# Patient Record
Sex: FEMALE | Race: WHITE | NOT HISPANIC OR LATINO
[De-identification: names, ages, dates, MRNs, and addresses within clinical notes are randomized per-mention and may not be internally consistent; named-entity substitution may affect disease eponyms.]

---

## 2017-03-22 ENCOUNTER — FORM ENCOUNTER (OUTPATIENT)
Age: 57
End: 2017-03-22

## 2017-03-23 ENCOUNTER — OUTPATIENT (OUTPATIENT)
Dept: OUTPATIENT SERVICES | Facility: HOSPITAL | Age: 57
LOS: 1 days | End: 2017-03-23
Payer: MEDICAID

## 2017-03-23 ENCOUNTER — APPOINTMENT (OUTPATIENT)
Dept: CT IMAGING | Facility: CLINIC | Age: 57
End: 2017-03-23

## 2017-03-23 DIAGNOSIS — G95.20 UNSPECIFIED CORD COMPRESSION: ICD-10-CM

## 2017-03-23 DIAGNOSIS — D43.2 NEOPLASM OF UNCERTAIN BEHAVIOR OF BRAIN, UNSPECIFIED: ICD-10-CM

## 2017-03-23 DIAGNOSIS — Q76.1 KLIPPEL-FEIL SYNDROME: Chronic | ICD-10-CM

## 2017-03-23 DIAGNOSIS — Z98.89 OTHER SPECIFIED POSTPROCEDURAL STATES: Chronic | ICD-10-CM

## 2017-03-23 DIAGNOSIS — Z90.89 ACQUIRED ABSENCE OF OTHER ORGANS: Chronic | ICD-10-CM

## 2017-03-23 PROCEDURE — 76377 3D RENDER W/INTRP POSTPROCES: CPT

## 2017-03-23 PROCEDURE — 72125 CT NECK SPINE W/O DYE: CPT

## 2017-04-17 ENCOUNTER — APPOINTMENT (OUTPATIENT)
Dept: NEUROSURGERY | Facility: CLINIC | Age: 57
End: 2017-04-17

## 2017-04-17 VITALS
DIASTOLIC BLOOD PRESSURE: 60 MMHG | WEIGHT: 120 LBS | SYSTOLIC BLOOD PRESSURE: 110 MMHG | OXYGEN SATURATION: 98 % | HEART RATE: 74 BPM | BODY MASS INDEX: 19.29 KG/M2 | TEMPERATURE: 98 F | HEIGHT: 66 IN

## 2017-04-17 DIAGNOSIS — G93.0 CEREBRAL CYSTS: ICD-10-CM

## 2017-10-18 ENCOUNTER — OUTPATIENT (OUTPATIENT)
Dept: OUTPATIENT SERVICES | Facility: HOSPITAL | Age: 57
LOS: 1 days | End: 2017-10-18
Payer: MEDICARE

## 2017-10-18 ENCOUNTER — APPOINTMENT (OUTPATIENT)
Dept: ULTRASOUND IMAGING | Facility: CLINIC | Age: 57
End: 2017-10-18
Payer: MEDICARE

## 2017-10-18 DIAGNOSIS — Z00.8 ENCOUNTER FOR OTHER GENERAL EXAMINATION: ICD-10-CM

## 2017-10-18 DIAGNOSIS — Z90.89 ACQUIRED ABSENCE OF OTHER ORGANS: Chronic | ICD-10-CM

## 2017-10-18 DIAGNOSIS — Z98.89 OTHER SPECIFIED POSTPROCEDURAL STATES: Chronic | ICD-10-CM

## 2017-10-18 DIAGNOSIS — Q76.1 KLIPPEL-FEIL SYNDROME: Chronic | ICD-10-CM

## 2017-10-18 PROCEDURE — 93970 EXTREMITY STUDY: CPT | Mod: 26

## 2017-10-18 PROCEDURE — 93970 EXTREMITY STUDY: CPT

## 2017-10-24 ENCOUNTER — FORM ENCOUNTER (OUTPATIENT)
Age: 57
End: 2017-10-24

## 2017-10-25 ENCOUNTER — APPOINTMENT (OUTPATIENT)
Dept: MRI IMAGING | Facility: CLINIC | Age: 57
End: 2017-10-25
Payer: MEDICARE

## 2017-10-25 ENCOUNTER — OUTPATIENT (OUTPATIENT)
Dept: OUTPATIENT SERVICES | Facility: HOSPITAL | Age: 57
LOS: 1 days | End: 2017-10-25
Payer: MEDICARE

## 2017-10-25 ENCOUNTER — APPOINTMENT (OUTPATIENT)
Dept: CT IMAGING | Facility: CLINIC | Age: 57
End: 2017-10-25
Payer: MEDICARE

## 2017-10-25 DIAGNOSIS — Z98.89 OTHER SPECIFIED POSTPROCEDURAL STATES: Chronic | ICD-10-CM

## 2017-10-25 DIAGNOSIS — R06.02 SHORTNESS OF BREATH: ICD-10-CM

## 2017-10-25 DIAGNOSIS — G93.0 CEREBRAL CYSTS: ICD-10-CM

## 2017-10-25 DIAGNOSIS — Q76.1 KLIPPEL-FEIL SYNDROME: Chronic | ICD-10-CM

## 2017-10-25 DIAGNOSIS — Z90.89 ACQUIRED ABSENCE OF OTHER ORGANS: Chronic | ICD-10-CM

## 2017-10-25 PROCEDURE — 70553 MRI BRAIN STEM W/O & W/DYE: CPT | Mod: 26

## 2017-10-25 PROCEDURE — 71250 CT THORAX DX C-: CPT

## 2017-10-25 PROCEDURE — 72156 MRI NECK SPINE W/O & W/DYE: CPT | Mod: 26

## 2017-10-25 PROCEDURE — 70553 MRI BRAIN STEM W/O & W/DYE: CPT

## 2017-10-25 PROCEDURE — 71250 CT THORAX DX C-: CPT | Mod: 26

## 2017-10-25 PROCEDURE — 72156 MRI NECK SPINE W/O & W/DYE: CPT

## 2017-10-25 PROCEDURE — A9585: CPT

## 2017-10-27 ENCOUNTER — APPOINTMENT (OUTPATIENT)
Dept: MRI IMAGING | Facility: CLINIC | Age: 57
End: 2017-10-27

## 2017-10-30 DIAGNOSIS — Z98.1 ARTHRODESIS STATUS: ICD-10-CM

## 2017-10-30 DIAGNOSIS — G93.89 OTHER SPECIFIED DISORDERS OF BRAIN: ICD-10-CM

## 2017-10-30 DIAGNOSIS — R91.8 OTHER NONSPECIFIC ABNORMAL FINDING OF LUNG FIELD: ICD-10-CM

## 2017-10-30 DIAGNOSIS — J43.9 EMPHYSEMA, UNSPECIFIED: ICD-10-CM

## 2018-01-09 ENCOUNTER — OUTPATIENT (OUTPATIENT)
Dept: OUTPATIENT SERVICES | Facility: HOSPITAL | Age: 58
LOS: 1 days | End: 2018-01-09
Payer: MEDICARE

## 2018-01-09 ENCOUNTER — APPOINTMENT (OUTPATIENT)
Dept: RADIOLOGY | Facility: CLINIC | Age: 58
End: 2018-01-09

## 2018-01-09 DIAGNOSIS — Q76.1 KLIPPEL-FEIL SYNDROME: Chronic | ICD-10-CM

## 2018-01-09 DIAGNOSIS — Z90.89 ACQUIRED ABSENCE OF OTHER ORGANS: Chronic | ICD-10-CM

## 2018-01-09 DIAGNOSIS — M25.50 PAIN IN UNSPECIFIED JOINT: ICD-10-CM

## 2018-01-09 DIAGNOSIS — R76.9 ABNORMAL IMMUNOLOGICAL FINDING IN SERUM, UNSPECIFIED: ICD-10-CM

## 2018-01-09 DIAGNOSIS — Z98.89 OTHER SPECIFIED POSTPROCEDURAL STATES: Chronic | ICD-10-CM

## 2018-01-09 DIAGNOSIS — R70.0 ELEVATED ERYTHROCYTE SEDIMENTATION RATE: ICD-10-CM

## 2018-01-09 PROCEDURE — 72170 X-RAY EXAM OF PELVIS: CPT | Mod: 26

## 2018-01-09 PROCEDURE — 73630 X-RAY EXAM OF FOOT: CPT

## 2018-01-09 PROCEDURE — 73080 X-RAY EXAM OF ELBOW: CPT

## 2018-01-09 PROCEDURE — 73562 X-RAY EXAM OF KNEE 3: CPT

## 2018-01-09 PROCEDURE — 73630 X-RAY EXAM OF FOOT: CPT | Mod: 26,50

## 2018-01-09 PROCEDURE — 73130 X-RAY EXAM OF HAND: CPT | Mod: 26,50

## 2018-01-09 PROCEDURE — 72170 X-RAY EXAM OF PELVIS: CPT

## 2018-01-09 PROCEDURE — 73080 X-RAY EXAM OF ELBOW: CPT | Mod: 26,50

## 2018-01-09 PROCEDURE — 73030 X-RAY EXAM OF SHOULDER: CPT | Mod: 26,50

## 2018-01-09 PROCEDURE — 73030 X-RAY EXAM OF SHOULDER: CPT

## 2018-01-09 PROCEDURE — 73130 X-RAY EXAM OF HAND: CPT

## 2018-01-09 PROCEDURE — 73562 X-RAY EXAM OF KNEE 3: CPT | Mod: 26,50

## 2021-05-11 ENCOUNTER — INPATIENT (INPATIENT)
Facility: HOSPITAL | Age: 61
LOS: 3 days | Discharge: HOME | DRG: 189 | End: 2021-05-15
Attending: STUDENT IN AN ORGANIZED HEALTH CARE EDUCATION/TRAINING PROGRAM | Admitting: STUDENT IN AN ORGANIZED HEALTH CARE EDUCATION/TRAINING PROGRAM
Payer: MEDICARE

## 2021-05-11 VITALS — WEIGHT: 134.92 LBS | HEIGHT: 65 IN | HEART RATE: 122 BPM

## 2021-05-11 DIAGNOSIS — Z98.89 OTHER SPECIFIED POSTPROCEDURAL STATES: Chronic | ICD-10-CM

## 2021-05-11 DIAGNOSIS — Q76.1 KLIPPEL-FEIL SYNDROME: Chronic | ICD-10-CM

## 2021-05-11 DIAGNOSIS — J44.9 CHRONIC OBSTRUCTIVE PULMONARY DISEASE, UNSPECIFIED: ICD-10-CM

## 2021-05-11 DIAGNOSIS — Z90.89 ACQUIRED ABSENCE OF OTHER ORGANS: Chronic | ICD-10-CM

## 2021-05-11 LAB
ALBUMIN SERPL ELPH-MCNC: 4.4 G/DL — SIGNIFICANT CHANGE UP (ref 3.3–5.2)
ALP SERPL-CCNC: 86 U/L — SIGNIFICANT CHANGE UP (ref 40–120)
ALT FLD-CCNC: 10 U/L — SIGNIFICANT CHANGE UP
ANION GAP SERPL CALC-SCNC: 10 MMOL/L — SIGNIFICANT CHANGE UP (ref 5–17)
AST SERPL-CCNC: 29 U/L — SIGNIFICANT CHANGE UP
BASOPHILS # BLD AUTO: 0.12 K/UL — SIGNIFICANT CHANGE UP (ref 0–0.2)
BASOPHILS NFR BLD AUTO: 1 % — SIGNIFICANT CHANGE UP (ref 0–2)
BILIRUB SERPL-MCNC: 0.2 MG/DL — LOW (ref 0.4–2)
BUN SERPL-MCNC: 9 MG/DL — SIGNIFICANT CHANGE UP (ref 8–20)
CALCIUM SERPL-MCNC: 9 MG/DL — SIGNIFICANT CHANGE UP (ref 8.6–10.2)
CHLORIDE SERPL-SCNC: 100 MMOL/L — SIGNIFICANT CHANGE UP (ref 98–107)
CO2 SERPL-SCNC: 25 MMOL/L — SIGNIFICANT CHANGE UP (ref 22–29)
CREAT SERPL-MCNC: 0.51 MG/DL — SIGNIFICANT CHANGE UP (ref 0.5–1.3)
EOSINOPHIL # BLD AUTO: 0.63 K/UL — HIGH (ref 0–0.5)
EOSINOPHIL NFR BLD AUTO: 5.3 % — SIGNIFICANT CHANGE UP (ref 0–6)
GLUCOSE SERPL-MCNC: 127 MG/DL — HIGH (ref 70–99)
HCT VFR BLD CALC: 45 % — SIGNIFICANT CHANGE UP (ref 34.5–45)
HGB BLD-MCNC: 14.1 G/DL — SIGNIFICANT CHANGE UP (ref 11.5–15.5)
IMM GRANULOCYTES NFR BLD AUTO: 0.4 % — SIGNIFICANT CHANGE UP (ref 0–1.5)
LYMPHOCYTES # BLD AUTO: 17.3 % — SIGNIFICANT CHANGE UP (ref 13–44)
LYMPHOCYTES # BLD AUTO: 2.07 K/UL — SIGNIFICANT CHANGE UP (ref 1–3.3)
MCHC RBC-ENTMCNC: 31.3 GM/DL — LOW (ref 32–36)
MCHC RBC-ENTMCNC: 31.3 PG — SIGNIFICANT CHANGE UP (ref 27–34)
MCV RBC AUTO: 99.8 FL — SIGNIFICANT CHANGE UP (ref 80–100)
MONOCYTES # BLD AUTO: 0.79 K/UL — SIGNIFICANT CHANGE UP (ref 0–0.9)
MONOCYTES NFR BLD AUTO: 6.6 % — SIGNIFICANT CHANGE UP (ref 2–14)
NEUTROPHILS # BLD AUTO: 8.31 K/UL — HIGH (ref 1.8–7.4)
NEUTROPHILS NFR BLD AUTO: 69.4 % — SIGNIFICANT CHANGE UP (ref 43–77)
NT-PROBNP SERPL-SCNC: 206 PG/ML — SIGNIFICANT CHANGE UP (ref 0–300)
PLATELET # BLD AUTO: 231 K/UL — SIGNIFICANT CHANGE UP (ref 150–400)
POTASSIUM SERPL-MCNC: 5.4 MMOL/L — HIGH (ref 3.5–5.3)
POTASSIUM SERPL-SCNC: 5.4 MMOL/L — HIGH (ref 3.5–5.3)
PROT SERPL-MCNC: 7.5 G/DL — SIGNIFICANT CHANGE UP (ref 6.6–8.7)
RBC # BLD: 4.51 M/UL — SIGNIFICANT CHANGE UP (ref 3.8–5.2)
RBC # FLD: 13.4 % — SIGNIFICANT CHANGE UP (ref 10.3–14.5)
SARS-COV-2 RNA SPEC QL NAA+PROBE: SIGNIFICANT CHANGE UP
SODIUM SERPL-SCNC: 135 MMOL/L — SIGNIFICANT CHANGE UP (ref 135–145)
TROPONIN T SERPL-MCNC: <0.01 NG/ML — SIGNIFICANT CHANGE UP (ref 0–0.06)
WBC # BLD: 11.97 K/UL — HIGH (ref 3.8–10.5)
WBC # FLD AUTO: 11.97 K/UL — HIGH (ref 3.8–10.5)

## 2021-05-11 PROCEDURE — 93010 ELECTROCARDIOGRAM REPORT: CPT

## 2021-05-11 PROCEDURE — 99291 CRITICAL CARE FIRST HOUR: CPT | Mod: CS

## 2021-05-11 PROCEDURE — 71045 X-RAY EXAM CHEST 1 VIEW: CPT | Mod: 26

## 2021-05-11 PROCEDURE — 99223 1ST HOSP IP/OBS HIGH 75: CPT

## 2021-05-11 RX ORDER — METHOTREXATE 2.5 MG/1
6 TABLET ORAL
Qty: 0 | Refills: 0 | DISCHARGE

## 2021-05-11 RX ORDER — ENOXAPARIN SODIUM 100 MG/ML
40 INJECTION SUBCUTANEOUS DAILY
Refills: 0 | Status: DISCONTINUED | OUTPATIENT
Start: 2021-05-11 | End: 2021-05-15

## 2021-05-11 RX ORDER — GABAPENTIN 400 MG/1
900 CAPSULE ORAL THREE TIMES A DAY
Refills: 0 | Status: DISCONTINUED | OUTPATIENT
Start: 2021-05-11 | End: 2021-05-15

## 2021-05-11 RX ORDER — FOLIC ACID 0.8 MG
1 TABLET ORAL
Qty: 0 | Refills: 0 | DISCHARGE

## 2021-05-11 RX ORDER — ALBUTEROL 90 UG/1
2 AEROSOL, METERED ORAL EVERY 4 HOURS
Refills: 0 | Status: DISCONTINUED | OUTPATIENT
Start: 2021-05-11 | End: 2021-05-15

## 2021-05-11 RX ORDER — FOLIC ACID 0.8 MG
1 TABLET ORAL DAILY
Refills: 0 | Status: DISCONTINUED | OUTPATIENT
Start: 2021-05-11 | End: 2021-05-15

## 2021-05-11 RX ORDER — IPRATROPIUM/ALBUTEROL SULFATE 18-103MCG
3 AEROSOL WITH ADAPTER (GRAM) INHALATION EVERY 6 HOURS
Refills: 0 | Status: DISCONTINUED | OUTPATIENT
Start: 2021-05-11 | End: 2021-05-13

## 2021-05-11 RX ORDER — OXYCODONE AND ACETAMINOPHEN 5; 325 MG/1; MG/1
1 TABLET ORAL EVERY 6 HOURS
Refills: 0 | Status: DISCONTINUED | OUTPATIENT
Start: 2021-05-11 | End: 2021-05-15

## 2021-05-11 RX ORDER — AZITHROMYCIN 500 MG/1
500 TABLET, FILM COATED ORAL ONCE
Refills: 0 | Status: COMPLETED | OUTPATIENT
Start: 2021-05-11 | End: 2021-05-11

## 2021-05-11 RX ORDER — IPRATROPIUM/ALBUTEROL SULFATE 18-103MCG
3 AEROSOL WITH ADAPTER (GRAM) INHALATION
Refills: 0 | Status: DISCONTINUED | OUTPATIENT
Start: 2021-05-11 | End: 2021-05-13

## 2021-05-11 RX ORDER — MAGNESIUM SULFATE 500 MG/ML
2 VIAL (ML) INJECTION ONCE
Refills: 0 | Status: COMPLETED | OUTPATIENT
Start: 2021-05-11 | End: 2021-05-11

## 2021-05-11 RX ORDER — METHOTREXATE 2.5 MG/1
15 TABLET ORAL
Refills: 0 | Status: DISCONTINUED | OUTPATIENT
Start: 2021-05-11 | End: 2021-05-15

## 2021-05-11 RX ADMIN — Medication 3 MILLILITER(S): at 21:01

## 2021-05-11 RX ADMIN — Medication 40 MILLIGRAM(S): at 23:17

## 2021-05-11 RX ADMIN — Medication 40 MILLIGRAM(S): at 17:56

## 2021-05-11 RX ADMIN — Medication 3 MILLILITER(S): at 17:59

## 2021-05-11 RX ADMIN — GABAPENTIN 900 MILLIGRAM(S): 400 CAPSULE ORAL at 23:17

## 2021-05-11 RX ADMIN — AZITHROMYCIN 255 MILLIGRAM(S): 500 TABLET, FILM COATED ORAL at 21:01

## 2021-05-11 RX ADMIN — Medication 50 GRAM(S): at 17:56

## 2021-05-11 NOTE — ED PROVIDER NOTE - OBJECTIVE STATEMENT
Patient is a 60 year old female with history of RA, COPD presenting with cough and sob. Symptoms began 2 days ago but worsened today. Pt tried albuterol and 40 mg prednisone today with some improvement but symptoms worsened again. + covid vaccination. No sick contacts. Smokes 1 ppd. She notes this is the worse her symptoms have ever been. not on home O2. never used bipap or intubated. Pt hypoxic to 75% on room air in the field.

## 2021-05-11 NOTE — H&P ADULT - NSICDXPASTMEDICALHX_GEN_ALL_CORE_FT
PAST MEDICAL HISTORY:  Neuropathy     Rheumatoid arthritis, involving unspecified site, unspecified whether rheumatoid factor present     Tumors Spinal tumor

## 2021-05-11 NOTE — H&P ADULT - NSICDXPASTSURGICALHX_GEN_ALL_CORE_FT
PAST SURGICAL HISTORY:  Cervical fusion syndrome C2-C3    S/P cervical discectomy     S/P eye surgery Right Cataract secondary to trauma    S/P tonsillectomy

## 2021-05-11 NOTE — ED PROVIDER NOTE - PSH
Cervical fusion syndrome  C2-C3  S/P cervical discectomy    S/P eye surgery  Right Cataract secondary to trauma  S/P tonsillectomy

## 2021-05-11 NOTE — ED ADULT TRIAGE NOTE - CHIEF COMPLAINT QUOTE
pt reports having SOB x 1 day. as per EMS pt was 75% on Room air, currently NRB, able to speak in full sentence, Dr. Ma called to bedside

## 2021-05-11 NOTE — H&P ADULT - HISTORY OF PRESENT ILLNESS
59 y/o female with PMH of RA on MTX, chronic back pain, neuropathy, active cigarette use, came to the ED complaining of shortness of breath x 2 days duration. Patient said her symptom has worsen today, she used her inhaler several time with minimal relief, used her friend's oxygen as well. She noted occasional cough productive of clear sputum. She has no fever, chills, chest pain, recent travel, sick contact, nausea, vomiting, abdominal pain. She noted shortness of breath in the past and cough due to bronchitis but this is worse.  61 y/o female with PMH of RA on MTX, chronic back pain, neuropathy, active cigarette use, came to the ED complaining of shortness of breath x 2 days duration. Patient said her symptom has worsen today, she used her inhaler several time with minimal relief, used her friend's oxygen as well. She noted occasional cough productive of clear sputum. She has no fever, chills, chest pain, recent travel, sick contact, nausea, vomiting, abdominal pain. She noted shortness of breath in the past and cough due to bronchitis but this is worse. Of note, she is s/p covid vaccine 4/20 (2nd dose).

## 2021-05-11 NOTE — ED PROVIDER NOTE - CARE PLAN
Principal Discharge DX:	Chronic obstructive pulmonary disease, unspecified COPD type  Secondary Diagnosis:	Hypoxia

## 2021-05-11 NOTE — ED PROVIDER NOTE - CRITICAL CARE ATTENDING CONTRIBUTION TO CARE
AJM: pt in significant resp distress with hypoxia needing supplemental O2 and frequent reassessments

## 2021-05-11 NOTE — H&P ADULT - NSHPSOCIALHISTORY_GEN_ALL_CORE
Active cigarette use 1PPD "for long time", drinks alcohol occasionally, no illicit drug use. Lives with family

## 2021-05-11 NOTE — ED PROVIDER NOTE - CLINICAL SUMMARY MEDICAL DECISION MAKING FREE TEXT BOX
pt with copd exacerbation. will obtain labs, vbg, trop, ecg, cxr, nebs, steroids, mag, covid, admit. stable on NC at this point. No need for bipap or intubation

## 2021-05-11 NOTE — ED PROVIDER NOTE - PMH
Neuropathy    Rheumatoid arthritis, involving unspecified site, unspecified whether rheumatoid factor present    Tumors  Spinal tumor

## 2021-05-11 NOTE — ED ADULT NURSE NOTE - OBJECTIVE STATEMENT
Patient presents to ER C/O SOB, reports onset of symptoms yesterday with low O2 sat (70's) as per niece who is a registered nurse at [employer], denies fever, patient is everyday smoker, unsure if COPD, reports taking albuterol and O2 at home with no significant improvement, denies sick contacts/recent travel, resp even/labored, wheezing noted bilaterally.

## 2021-05-11 NOTE — H&P ADULT - NSHPPHYSICALEXAM_GEN_ALL_CORE
Vital Signs Last 24 Hrs  T(C): 37.1 (11 May 2021 17:48), Max: 37.1 (11 May 2021 17:48)  T(F): 98.7 (11 May 2021 17:48), Max: 98.7 (11 May 2021 17:48)  HR: 105 (11 May 2021 20:34) (105 - 122)  BP: 144/88 (11 May 2021 20:34) (124/76 - 144/88)  BP(mean): --  RR: 22 (11 May 2021 20:34) (22 - 24)  SpO2: 95% (11 May 2021 20:34) (95% - 97%)

## 2021-05-11 NOTE — H&P ADULT - ASSESSMENT
61 y/o female with PMH of RA on MTX, chronic back pain, neuropathy, active cigarette use, came to the ED complaining of shortness of breath x 2 days duration. Patient said her symptom has worsen today, she used her inhaler several time with minimal relief, used her friend's oxygen as well. She noted occasional cough productive of clear sputum. S      61 y/o female with PMH of RA on MTX, chronic back pain, neuropathy, active cigarette use, came to the ED complaining of shortness of breath x 2 days duration. Patient said her symptom has worsen today, she used her inhaler several time with minimal relief, used her friend's oxygen as well. She noted occasional cough productive of clear sputum.     Acute respiratory distress 2/2 COPD exacerbation   Admit to telemetry   Mg and Solumedrol given in the ED, will continue Solumedrol 40mg q6h. Monitor glucose   Duoneb standing and Albuterol PRN   Will get ABG   As per patient she has not been diagnosed with COPD, her PCP placed her on Symbicort for shortness of breath   Pulmonology consulted   Will likely need PFT outpatient.     RA   On Methotrexate 15mg every Tuesday     Tobacco use  Cessation advised   Nicotine patch offered, patient declined     Chronic back pain/neuropathy   Gabapentin 900mg tid   Norco 10/325mg qid (non-formulary) will give percocet as needed     Supportive   DVT prophylaxis: Lovenox   Diet: regular     Plan of care discussed with patient, daughter and niece at bed side     65 minutes spent

## 2021-05-12 LAB
ANION GAP SERPL CALC-SCNC: 10 MMOL/L — SIGNIFICANT CHANGE UP (ref 5–17)
BUN SERPL-MCNC: 13 MG/DL — SIGNIFICANT CHANGE UP (ref 8–20)
CALCIUM SERPL-MCNC: 9.9 MG/DL — SIGNIFICANT CHANGE UP (ref 8.6–10.2)
CHLORIDE SERPL-SCNC: 103 MMOL/L — SIGNIFICANT CHANGE UP (ref 98–107)
CO2 SERPL-SCNC: 27 MMOL/L — SIGNIFICANT CHANGE UP (ref 22–29)
COVID-19 SPIKE DOMAIN AB INTERP: POSITIVE
COVID-19 SPIKE DOMAIN ANTIBODY RESULT: >250 U/ML — HIGH
CREAT SERPL-MCNC: 0.52 MG/DL — SIGNIFICANT CHANGE UP (ref 0.5–1.3)
GLUCOSE BLDC GLUCOMTR-MCNC: 142 MG/DL — HIGH (ref 70–99)
GLUCOSE SERPL-MCNC: 148 MG/DL — HIGH (ref 70–99)
HCT VFR BLD CALC: 44.6 % — SIGNIFICANT CHANGE UP (ref 34.5–45)
HCV AB S/CO SERPL IA: 0.13 S/CO — SIGNIFICANT CHANGE UP (ref 0–0.99)
HCV AB SERPL-IMP: SIGNIFICANT CHANGE UP
HGB BLD-MCNC: 14.2 G/DL — SIGNIFICANT CHANGE UP (ref 11.5–15.5)
MCHC RBC-ENTMCNC: 31.4 PG — SIGNIFICANT CHANGE UP (ref 27–34)
MCHC RBC-ENTMCNC: 31.8 GM/DL — LOW (ref 32–36)
MCV RBC AUTO: 98.7 FL — SIGNIFICANT CHANGE UP (ref 80–100)
PLATELET # BLD AUTO: 181 K/UL — SIGNIFICANT CHANGE UP (ref 150–400)
POTASSIUM SERPL-MCNC: 4.6 MMOL/L — SIGNIFICANT CHANGE UP (ref 3.5–5.3)
POTASSIUM SERPL-SCNC: 4.6 MMOL/L — SIGNIFICANT CHANGE UP (ref 3.5–5.3)
RBC # BLD: 4.52 M/UL — SIGNIFICANT CHANGE UP (ref 3.8–5.2)
RBC # FLD: 13.7 % — SIGNIFICANT CHANGE UP (ref 10.3–14.5)
SARS-COV-2 IGG+IGM SERPL QL IA: >250 U/ML — HIGH
SARS-COV-2 IGG+IGM SERPL QL IA: POSITIVE
SODIUM SERPL-SCNC: 140 MMOL/L — SIGNIFICANT CHANGE UP (ref 135–145)
WBC # BLD: 12.9 K/UL — HIGH (ref 3.8–10.5)
WBC # FLD AUTO: 12.9 K/UL — HIGH (ref 3.8–10.5)

## 2021-05-12 PROCEDURE — 99233 SBSQ HOSP IP/OBS HIGH 50: CPT

## 2021-05-12 PROCEDURE — 99222 1ST HOSP IP/OBS MODERATE 55: CPT

## 2021-05-12 PROCEDURE — 71275 CT ANGIOGRAPHY CHEST: CPT | Mod: 26

## 2021-05-12 RX ORDER — NICOTINE POLACRILEX 2 MG
2 GUM BUCCAL
Refills: 0 | Status: DISCONTINUED | OUTPATIENT
Start: 2021-05-12 | End: 2021-05-15

## 2021-05-12 RX ORDER — AZITHROMYCIN 500 MG/1
500 TABLET, FILM COATED ORAL DAILY
Refills: 0 | Status: COMPLETED | OUTPATIENT
Start: 2021-05-12 | End: 2021-05-13

## 2021-05-12 RX ADMIN — OXYCODONE AND ACETAMINOPHEN 1 TABLET(S): 5; 325 TABLET ORAL at 11:53

## 2021-05-12 RX ADMIN — Medication 40 MILLIGRAM(S): at 23:24

## 2021-05-12 RX ADMIN — Medication 3 MILLILITER(S): at 20:50

## 2021-05-12 RX ADMIN — AZITHROMYCIN 500 MILLIGRAM(S): 500 TABLET, FILM COATED ORAL at 12:06

## 2021-05-12 RX ADMIN — Medication 2 MILLIGRAM(S): at 20:46

## 2021-05-12 RX ADMIN — Medication 40 MILLIGRAM(S): at 11:19

## 2021-05-12 RX ADMIN — GABAPENTIN 900 MILLIGRAM(S): 400 CAPSULE ORAL at 21:29

## 2021-05-12 RX ADMIN — GABAPENTIN 900 MILLIGRAM(S): 400 CAPSULE ORAL at 11:20

## 2021-05-12 RX ADMIN — ENOXAPARIN SODIUM 40 MILLIGRAM(S): 100 INJECTION SUBCUTANEOUS at 11:19

## 2021-05-12 RX ADMIN — OXYCODONE AND ACETAMINOPHEN 1 TABLET(S): 5; 325 TABLET ORAL at 23:25

## 2021-05-12 RX ADMIN — Medication 40 MILLIGRAM(S): at 17:06

## 2021-05-12 RX ADMIN — Medication 40 MILLIGRAM(S): at 06:03

## 2021-05-12 RX ADMIN — OXYCODONE AND ACETAMINOPHEN 1 TABLET(S): 5; 325 TABLET ORAL at 11:24

## 2021-05-12 RX ADMIN — Medication 2 MILLIGRAM(S): at 16:02

## 2021-05-12 RX ADMIN — Medication 3 MILLILITER(S): at 14:50

## 2021-05-12 RX ADMIN — Medication 1 MILLIGRAM(S): at 11:20

## 2021-05-12 RX ADMIN — GABAPENTIN 900 MILLIGRAM(S): 400 CAPSULE ORAL at 06:04

## 2021-05-12 RX ADMIN — OXYCODONE AND ACETAMINOPHEN 1 TABLET(S): 5; 325 TABLET ORAL at 17:08

## 2021-05-12 NOTE — PROGRESS NOTE ADULT - ASSESSMENT
61 y/o female with PMH of RA on MTX, chronic back pain, neuropathy, active cigarette use, came to the ED complaining of shortness of breath x 2 days duration. Patient said her symptom has worsen today, she used her inhaler several time with minimal relief, used her friend's oxygen as well. She noted occasional cough productive of clear sputum.     Acute hypoxic respiratory failure 2/2 COPD exacerbation   Mg and Solumedrol given in the ED, will continue Solumedrol 40mg q6h. Monitor glucose   Duoneb standing and Albuterol PRN   Solumedrol   Azithromycin for 3 total doses  NC - Titrate down as tolerated  ABG pending  As per patient she has not been diagnosed with COPD, her PCP placed her on Symbicort for shortness of breath   Pulmonology consulted by Meño   Will likely need PFT outpatient.     RA   On Methotrexate 15mg every Tuesday     Tobacco use  Cessation advised   Nicotine lozenges PRN    Chronic back pain/neuropathy   Gabapentin 900mg tid   Norco 10/325mg qid (non-formulary) will give percocet as needed     Supportive   DVT prophylaxis: Lovenox   Diet: regular     Discussed with patients sister over the phone while at patients bedside and updated    Dispo: Pending course            59 y/o female with PMH of RA on MTX, chronic back pain, neuropathy, active cigarette use, came to the ED complaining of shortness of breath x 2 days duration. Patient said her symptom has worsen today, she used her inhaler several time with minimal relief, used her friend's oxygen as well. She noted occasional cough productive of clear sputum.     Acute hypoxic respiratory failure 2/2 COPD exacerbation   Mg and Solumedrol given in the ED, will continue Solumedrol 40mg q6h. Monitor glucose   Duoneb standing and Albuterol PRN   Solumedrol   Azithromycin for 3 total doses  NC - Titrate down as tolerated  ABG pending  As per patient she has not been diagnosed with COPD, her PCP placed her on Symbicort for shortness of breath   Pulmonology consulted by Meño   Will likely need PFT outpatient.     RA   On Methotrexate 15mg every Tuesday     Tobacco use  Cessation advised   Nicotine lozenges PRN    Chronic back pain/neuropathy   Gabapentin 900mg tid   Norco 10/325mg qid (non-formulary) will give percocet as needed     LBBB  EKG reviewed - Similar EKG in 2016  As per patient, she follows with her PCP and her cardiologist for this outpatient   No current chest pain or any other findings concerning for ischemia   Outpatient cardiology follow up.     Supportive   DVT prophylaxis: Lovenox   Diet: regular     Discussed with patients sister over the phone while at patients bedside and updated    Dispo: Pending course

## 2021-05-12 NOTE — CONSULT NOTE ADULT - ASSESSMENT
59y/o    1- COPD Gold 4  with exacerbation   2- smoker  3-cat at home  4- sleep disorder likely   5-ekg LBBB      Recommendations  1- ctpa  2- echo with pap  3- RVP sputum culture procalcitonin   4- LE duplex  5-agree with IV solu medrol   6- duonebs  q 6 teaching  7-supplemental oxygen keep sats > 90 %  8- will likely need trilogy   9-smoking cessation discussed  10- cat igE   11-vaccinations - covid x 1       thank you kindly will follow  Please  feel free to reach out with any questions or suggestions    ERIC HOFFMAN    PULMONARY and CRITICAL CARE     [phone]     61y/o    1- COPD Gold 4  with exacerbation   2- smoker  3-cat at home  4- sleep disorder likely   5-ekg LBBB      Recommendations  1- ctpa  2- echo with pap  3- RVP sputum culture procalcitonin   4- LE duplex  5-agree with IV solu medrol   6- duonebs  q 6 teaching  7-supplemental oxygen keep sats > 90 %  8- will likely need trilogy   9-smoking cessation discussed  10- cat igE   11-vaccinations - covid x 1     12- HIV testing    alpha -1 antitrypsin level    thank you kindly will follow  Please  feel free to reach out with any questions or suggestions    ERIC HOFFMAN    PULMONARY and CRITICAL CARE     [phone]

## 2021-05-12 NOTE — CONSULT NOTE ADULT - SUBJECTIVE AND OBJECTIVE BOX
PULMONARY CONSULT NOTE    CORBY HARPER    MRN   [id number]    Patient is a 60y old  Female who presents with a chief complaint of COPD ex (12 May 2021 08:56)    HPI    61y/o pleasant female  smoker 1 pack day x 40 years still smoking  worked as  in past now disability  h/o severe COPD last seen by Dr Iglesias 2015  PFT FEV1 0.77 28 %    on symbicort prn only - h/o  RA on MTX chronic back pain from surgery,  neuropathy,   admitted via ED 5/11/2021   with sob woke up at night  with sob acute onset  worsening over the day  used her albuterol  several times with  no relief   Chronic cough feels stuck  however with symbicort can expectorate thick brown mucous    -  used her friends oxygen as well     -per EMS sats room air  75%-- diffuse wheezing in ED   -has cat at home  -never admitted for breathing issues in past  -was not ready to quit   -s/p covid vaccine 4/20/21  received  steroids in ED    -feeling much better     REVIEW OF SYSTEMS     CONSTITUTIONAL   no fevers  no loss of appetite  no weight loss   HEENT  no sore throat   no ringing in ears  NECK   no pain   RESPIRATORY  see HPI   CARDIOVASCULAR  no chest  pain no palpitations   GASTROINTESTINAL no vomiting  no diarrhea    no   gerd   MUSCULOSKELETAL  no joint pains    no  back pain   SKIN   no rash   no itchiness   GENITOURINARY  no dysuria   HEME    no bleeding or bruising   ENDOCRINE     no   warmth   no  sweating   no  cold intolerance   NEUROLOGIC   chronic  neuropathy  and   numbness arms legs  PSYCHIATRIC   no mood disorder    no delirium     PAST MEDICAL & SURGICAL HISTORY:  Tumors  Spinal tumor    Rheumatoid arthritis, involving unspecified site, unspecified whether rheumatoid factor present    Neuropathy    Cervical fusion syndrome  C2-C3    S/P cervical discectomy    S/P tonsillectomy    S/P eye surgery  Right Cataract secondary to trauma          Medications:  azithromycin   Tablet 500 milliGRAM(s) Oral daily      ALBUTerol    90 MICROgram(s) HFA Inhaler 2 Puff(s) Inhalation every 4 hours PRN  albuterol/ipratropium for Nebulization 3 milliLiter(s) Nebulizer every 6 hours  albuterol/ipratropium for Nebulization.. 3 milliLiter(s) Nebulizer every 20 minutes    gabapentin 900 milliGRAM(s) Oral three times a day  oxycodone    5 mG/acetaminophen 325 mG 1 Tablet(s) Oral every 6 hours PRN    methotrexate 15 milliGRAM(s) Oral <User Schedule>    enoxaparin Injectable 40 milliGRAM(s) SubCutaneous daily        methylPREDNISolone sodium succinate Injectable 40 milliGRAM(s) IV Push every 6 hours    folic acid 1 milliGRAM(s) Oral daily        nicotine  Polacrilex Lozenge 2 milliGRAM(s) Oral every 2 hours PRN          ICU Vital Signs Last 24 Hrs  T(C): 36.6 (12 May 2021 11:16), Max: 37.1 (11 May 2021 17:48)  T(F): 97.8 (12 May 2021 11:16), Max: 98.7 (11 May 2021 17:48)  HR: 85 (12 May 2021 11:16) (85 - 122)  BP: 109/66 (12 May 2021 11:16) (109/66 - 144/88)  BP(mean): --  ABP: --  ABP(mean): --  RR: 20 (12 May 2021 11:16) (20 - 24)  SpO2: 93% (12 May 2021 11:16) (90% - 97%)          I&O's Detail        LABS:                        14.2   12.90 )-----------( 181      ( 12 May 2021 07:07 )             44.6     05-12    140  |  103  |  13.0  ----------------------------<  148<H>  4.6   |  27.0  |  0.52    Ca    9.9      12 May 2021 07:07    TPro  7.5  /  Alb  4.4  /  TBili  0.2<L>  /  DBili  x   /  AST  29  /  ALT  10  /  AlkPhos  86  05-11      CARDIAC MARKERS ( 11 May 2021 17:58 )  x     / <0.01 ng/mL / x     / x     / x          CAPILLARY BLOOD GLUCOSE      POCT Blood Glucose.: 142 mg/dL (12 May 2021 07:28)        CULTURES:      Physical Examination:    General:   frail f  no distress   in bed speaking full sentences  sats  4l/min   92 %  HEENT: Pupils equal, reactive to light.  Symmetric. crowded airway no lesions      PULM:  air entry poor bilateral   exp scattered wheezing  NECK: Supple, no lymphadenopathy, trachea midline    CVS: Regular rate and rhythm, no murmurs, rubs, or gallops    ABD: Soft, nondistended, nontender, normoactive bowel sounds, no masses    EXT: No edema, nontender    SKIN: Warm and well perfused, no rashes noted.    NEURO: Alert, oriented, interactive, nonfocal    DEVICES:     RADIOLOGY: *  IMPRESSION:  Mild hyperinflation and mild emphysema with no acute parenchymal findings and no change            GABRIELLA WHITEHEAD DO; Attending Radiologist  This document has been electronically signed. May 12 2021 10:14AM  **

## 2021-05-13 LAB
ALBUMIN SERPL ELPH-MCNC: 4.2 G/DL — SIGNIFICANT CHANGE UP (ref 3.3–5.2)
ALP SERPL-CCNC: 82 U/L — SIGNIFICANT CHANGE UP (ref 40–120)
ALT FLD-CCNC: 10 U/L — SIGNIFICANT CHANGE UP
ANION GAP SERPL CALC-SCNC: 9 MMOL/L — SIGNIFICANT CHANGE UP (ref 5–17)
ANISOCYTOSIS BLD QL: SLIGHT — SIGNIFICANT CHANGE UP
AST SERPL-CCNC: 16 U/L — SIGNIFICANT CHANGE UP
BASOPHILS # BLD AUTO: 0 K/UL — SIGNIFICANT CHANGE UP (ref 0–0.2)
BASOPHILS NFR BLD AUTO: 0 % — SIGNIFICANT CHANGE UP (ref 0–2)
BILIRUB SERPL-MCNC: 0.3 MG/DL — LOW (ref 0.4–2)
BUN SERPL-MCNC: 15 MG/DL — SIGNIFICANT CHANGE UP (ref 8–20)
CALCIUM SERPL-MCNC: 9.6 MG/DL — SIGNIFICANT CHANGE UP (ref 8.6–10.2)
CHLORIDE SERPL-SCNC: 101 MMOL/L — SIGNIFICANT CHANGE UP (ref 98–107)
CO2 SERPL-SCNC: 29 MMOL/L — SIGNIFICANT CHANGE UP (ref 22–29)
CREAT SERPL-MCNC: 0.49 MG/DL — LOW (ref 0.5–1.3)
EOSINOPHIL # BLD AUTO: 0 K/UL — SIGNIFICANT CHANGE UP (ref 0–0.5)
EOSINOPHIL NFR BLD AUTO: 0 % — SIGNIFICANT CHANGE UP (ref 0–6)
GLUCOSE SERPL-MCNC: 129 MG/DL — HIGH (ref 70–99)
HCT VFR BLD CALC: 41.7 % — SIGNIFICANT CHANGE UP (ref 34.5–45)
HGB BLD-MCNC: 13.6 G/DL — SIGNIFICANT CHANGE UP (ref 11.5–15.5)
HYPOCHROMIA BLD QL: SLIGHT — SIGNIFICANT CHANGE UP
LYMPHOCYTES # BLD AUTO: 0.81 K/UL — LOW (ref 1–3.3)
LYMPHOCYTES # BLD AUTO: 3.5 % — LOW (ref 13–44)
MACROCYTES BLD QL: SLIGHT — SIGNIFICANT CHANGE UP
MANUAL SMEAR VERIFICATION: SIGNIFICANT CHANGE UP
MCHC RBC-ENTMCNC: 32 PG — SIGNIFICANT CHANGE UP (ref 27–34)
MCHC RBC-ENTMCNC: 32.6 GM/DL — SIGNIFICANT CHANGE UP (ref 32–36)
MCV RBC AUTO: 98.1 FL — SIGNIFICANT CHANGE UP (ref 80–100)
MONOCYTES # BLD AUTO: 0.21 K/UL — SIGNIFICANT CHANGE UP (ref 0–0.9)
MONOCYTES NFR BLD AUTO: 0.9 % — LOW (ref 2–14)
NEUTROPHILS # BLD AUTO: 22.22 K/UL — HIGH (ref 1.8–7.4)
NEUTROPHILS NFR BLD AUTO: 95.6 % — HIGH (ref 43–77)
PLAT MORPH BLD: NORMAL — SIGNIFICANT CHANGE UP
PLATELET # BLD AUTO: 253 K/UL — SIGNIFICANT CHANGE UP (ref 150–400)
POLYCHROMASIA BLD QL SMEAR: SLIGHT — SIGNIFICANT CHANGE UP
POTASSIUM SERPL-MCNC: 4.6 MMOL/L — SIGNIFICANT CHANGE UP (ref 3.5–5.3)
POTASSIUM SERPL-SCNC: 4.6 MMOL/L — SIGNIFICANT CHANGE UP (ref 3.5–5.3)
PROCALCITONIN SERPL-MCNC: 0.05 NG/ML — SIGNIFICANT CHANGE UP (ref 0.02–0.1)
PROT SERPL-MCNC: 7.2 G/DL — SIGNIFICANT CHANGE UP (ref 6.6–8.7)
RBC # BLD: 4.25 M/UL — SIGNIFICANT CHANGE UP (ref 3.8–5.2)
RBC # FLD: 13.8 % — SIGNIFICANT CHANGE UP (ref 10.3–14.5)
RBC BLD AUTO: ABNORMAL
SODIUM SERPL-SCNC: 139 MMOL/L — SIGNIFICANT CHANGE UP (ref 135–145)
WBC # BLD: 23.24 K/UL — HIGH (ref 3.8–10.5)
WBC # FLD AUTO: 23.24 K/UL — HIGH (ref 3.8–10.5)

## 2021-05-13 PROCEDURE — 93306 TTE W/DOPPLER COMPLETE: CPT | Mod: 26

## 2021-05-13 PROCEDURE — 99233 SBSQ HOSP IP/OBS HIGH 50: CPT

## 2021-05-13 RX ORDER — BUDESONIDE AND FORMOTEROL FUMARATE DIHYDRATE 160; 4.5 UG/1; UG/1
2 AEROSOL RESPIRATORY (INHALATION)
Refills: 0 | Status: DISCONTINUED | OUTPATIENT
Start: 2021-05-13 | End: 2021-05-15

## 2021-05-13 RX ORDER — TIOTROPIUM BROMIDE 18 UG/1
1 CAPSULE ORAL; RESPIRATORY (INHALATION) DAILY
Refills: 0 | Status: DISCONTINUED | OUTPATIENT
Start: 2021-05-13 | End: 2021-05-15

## 2021-05-13 RX ORDER — POLYETHYLENE GLYCOL 3350 17 G/17G
17 POWDER, FOR SOLUTION ORAL ONCE
Refills: 0 | Status: COMPLETED | OUTPATIENT
Start: 2021-05-13 | End: 2021-05-13

## 2021-05-13 RX ORDER — ALBUTEROL 90 UG/1
2 AEROSOL, METERED ORAL EVERY 4 HOURS
Refills: 0 | Status: COMPLETED | OUTPATIENT
Start: 2021-05-13 | End: 2022-04-11

## 2021-05-13 RX ORDER — ALBUTEROL 90 UG/1
2 AEROSOL, METERED ORAL EVERY 4 HOURS
Refills: 0 | Status: DISCONTINUED | OUTPATIENT
Start: 2021-05-13 | End: 2021-05-14

## 2021-05-13 RX ORDER — SODIUM CHLORIDE 0.65 %
1 AEROSOL, SPRAY (ML) NASAL EVERY 6 HOURS
Refills: 0 | Status: DISCONTINUED | OUTPATIENT
Start: 2021-05-13 | End: 2021-05-15

## 2021-05-13 RX ADMIN — BUDESONIDE AND FORMOTEROL FUMARATE DIHYDRATE 2 PUFF(S): 160; 4.5 AEROSOL RESPIRATORY (INHALATION) at 20:31

## 2021-05-13 RX ADMIN — Medication 2 MILLIGRAM(S): at 09:34

## 2021-05-13 RX ADMIN — OXYCODONE AND ACETAMINOPHEN 1 TABLET(S): 5; 325 TABLET ORAL at 17:21

## 2021-05-13 RX ADMIN — GABAPENTIN 900 MILLIGRAM(S): 400 CAPSULE ORAL at 11:30

## 2021-05-13 RX ADMIN — OXYCODONE AND ACETAMINOPHEN 1 TABLET(S): 5; 325 TABLET ORAL at 08:30

## 2021-05-13 RX ADMIN — OXYCODONE AND ACETAMINOPHEN 1 TABLET(S): 5; 325 TABLET ORAL at 22:39

## 2021-05-13 RX ADMIN — POLYETHYLENE GLYCOL 3350 17 GRAM(S): 17 POWDER, FOR SOLUTION ORAL at 16:22

## 2021-05-13 RX ADMIN — OXYCODONE AND ACETAMINOPHEN 1 TABLET(S): 5; 325 TABLET ORAL at 23:09

## 2021-05-13 RX ADMIN — ENOXAPARIN SODIUM 40 MILLIGRAM(S): 100 INJECTION SUBCUTANEOUS at 11:33

## 2021-05-13 RX ADMIN — Medication 40 MILLIGRAM(S): at 05:52

## 2021-05-13 RX ADMIN — GABAPENTIN 900 MILLIGRAM(S): 400 CAPSULE ORAL at 05:53

## 2021-05-13 RX ADMIN — GABAPENTIN 900 MILLIGRAM(S): 400 CAPSULE ORAL at 21:42

## 2021-05-13 RX ADMIN — Medication 40 MILLIGRAM(S): at 16:56

## 2021-05-13 RX ADMIN — OXYCODONE AND ACETAMINOPHEN 1 TABLET(S): 5; 325 TABLET ORAL at 00:00

## 2021-05-13 RX ADMIN — AZITHROMYCIN 500 MILLIGRAM(S): 500 TABLET, FILM COATED ORAL at 11:30

## 2021-05-13 RX ADMIN — Medication 1 SPRAY(S): at 21:42

## 2021-05-13 RX ADMIN — Medication 2 MILLIGRAM(S): at 19:21

## 2021-05-13 RX ADMIN — Medication 1 MILLIGRAM(S): at 11:30

## 2021-05-13 RX ADMIN — OXYCODONE AND ACETAMINOPHEN 1 TABLET(S): 5; 325 TABLET ORAL at 07:30

## 2021-05-13 RX ADMIN — OXYCODONE AND ACETAMINOPHEN 1 TABLET(S): 5; 325 TABLET ORAL at 16:21

## 2021-05-13 NOTE — PROGRESS NOTE ADULT - ASSESSMENT
61y/o    1- COPD Gold 4  with exacerbation   2- smoker  3-cat at home  4- sleep disorder likely   5-ekg LBBB  6- CTPA  neg PE mild emphysema     -taper steroids as tolerated  -z felix  - nicotine replacement  -spiriva   -symbicort  -albuterol   - abg am  for possible trilogy  -smoking cessation   -HIV  alpha - 1 antitrypsin   - gi dvt prophylaxis    Please  feel free to reach out with any questions or suggestions    ERIC HOFFMAN    PULMONARY and CRITICAL CARE     [phone]

## 2021-05-13 NOTE — PROGRESS NOTE ADULT - ASSESSMENT
59 y/o female with PMH of RA on MTX, chronic back pain, neuropathy, active cigarette use, came to the ED complaining of shortness of breath x 2 days duration. Patient said her symptom has worsen today, she used her inhaler several time with minimal relief, used her friend's oxygen as well. She noted occasional cough productive of clear sputum.     Acute hypoxic respiratory failure 2/2 COPD exacerbation   Mg and Solumedrol given in the ED,   Tapered IV steroids  Duoneb standing and Albuterol PRN   Azithromycin for 3 total doses  NC - Titrate down as tolerated  ABG pending  As per patient she has not been diagnosed with COPD, her PCP placed her on Symbicort for shortness of breath   Pulmonary recs appreciated  Will likely need PFT outpatient.   CTA: No PE  TTE: Pending     Leukocytosis  Likely secondary to steroids  Will continue to monitor     RA   On Methotrexate 15mg every Tuesday     Tobacco use  Cessation advised   Nicotine lozenges PRN    Chronic back pain/neuropathy   Gabapentin 900mg tid   Norco 10/325mg qid (non-formulary) will give percocet as needed     LBBB  EKG reviewed - Similar EKG in 2016  As per patient, she follows with her PCP and her cardiologist for this outpatient   No current chest pain or any other findings concerning for ischemia   TTE   Outpatient Cardiology f/u     Supportive   DVT prophylaxis: Lovenox   Diet: regular     Discussed with patients sister Svitlana and answered all questions on 5/13/2021    Dispo: Pending course, DC plan in 24-48 hours if oxygen can be tapered off

## 2021-05-14 ENCOUNTER — TRANSCRIPTION ENCOUNTER (OUTPATIENT)
Age: 61
End: 2021-05-14

## 2021-05-14 LAB
ALBUMIN SERPL ELPH-MCNC: 4.2 G/DL — SIGNIFICANT CHANGE UP (ref 3.3–5.2)
ALP SERPL-CCNC: 79 U/L — SIGNIFICANT CHANGE UP (ref 40–120)
ALT FLD-CCNC: 11 U/L — SIGNIFICANT CHANGE UP
ANION GAP SERPL CALC-SCNC: 11 MMOL/L — SIGNIFICANT CHANGE UP (ref 5–17)
AST SERPL-CCNC: 16 U/L — SIGNIFICANT CHANGE UP
BASE EXCESS BLDA CALC-SCNC: 8.5 MMOL/L — HIGH (ref -2–2)
BASOPHILS # BLD AUTO: 0.03 K/UL — SIGNIFICANT CHANGE UP (ref 0–0.2)
BASOPHILS NFR BLD AUTO: 0.2 % — SIGNIFICANT CHANGE UP (ref 0–2)
BILIRUB SERPL-MCNC: 0.4 MG/DL — SIGNIFICANT CHANGE UP (ref 0.4–2)
BLOOD GAS COMMENTS ARTERIAL: SIGNIFICANT CHANGE UP
BUN SERPL-MCNC: 20 MG/DL — SIGNIFICANT CHANGE UP (ref 8–20)
CALCIUM SERPL-MCNC: 9.4 MG/DL — SIGNIFICANT CHANGE UP (ref 8.6–10.2)
CHLORIDE SERPL-SCNC: 99 MMOL/L — SIGNIFICANT CHANGE UP (ref 98–107)
CO2 SERPL-SCNC: 30 MMOL/L — HIGH (ref 22–29)
CREAT SERPL-MCNC: 0.53 MG/DL — SIGNIFICANT CHANGE UP (ref 0.5–1.3)
EOSINOPHIL # BLD AUTO: 0 K/UL — SIGNIFICANT CHANGE UP (ref 0–0.5)
EOSINOPHIL NFR BLD AUTO: 0 % — SIGNIFICANT CHANGE UP (ref 0–6)
GAS PNL BLDA: SIGNIFICANT CHANGE UP
GLUCOSE SERPL-MCNC: 113 MG/DL — HIGH (ref 70–99)
HCO3 BLDA-SCNC: 32 MMOL/L — HIGH (ref 20–26)
HCT VFR BLD CALC: 42.3 % — SIGNIFICANT CHANGE UP (ref 34.5–45)
HGB BLD-MCNC: 13.4 G/DL — SIGNIFICANT CHANGE UP (ref 11.5–15.5)
HOROWITZ INDEX BLDA+IHG-RTO: 21 — SIGNIFICANT CHANGE UP
IGE SERPL-ACNC: 213 KU/L — HIGH
IMM GRANULOCYTES NFR BLD AUTO: 0.8 % — SIGNIFICANT CHANGE UP (ref 0–1.5)
LYMPHOCYTES # BLD AUTO: 1.98 K/UL — SIGNIFICANT CHANGE UP (ref 1–3.3)
LYMPHOCYTES # BLD AUTO: 11.7 % — LOW (ref 13–44)
MCHC RBC-ENTMCNC: 31.4 PG — SIGNIFICANT CHANGE UP (ref 27–34)
MCHC RBC-ENTMCNC: 31.7 GM/DL — LOW (ref 32–36)
MCV RBC AUTO: 99.1 FL — SIGNIFICANT CHANGE UP (ref 80–100)
MONOCYTES # BLD AUTO: 0.87 K/UL — SIGNIFICANT CHANGE UP (ref 0–0.9)
MONOCYTES NFR BLD AUTO: 5.1 % — SIGNIFICANT CHANGE UP (ref 2–14)
NEUTROPHILS # BLD AUTO: 13.95 K/UL — HIGH (ref 1.8–7.4)
NEUTROPHILS NFR BLD AUTO: 82.2 % — HIGH (ref 43–77)
PCO2 BLDA: 48 MMHG — HIGH (ref 35–45)
PH BLDA: 7.46 — HIGH (ref 7.35–7.45)
PLATELET # BLD AUTO: 246 K/UL — SIGNIFICANT CHANGE UP (ref 150–400)
PO2 BLDA: 73 MMHG — LOW (ref 83–108)
POTASSIUM SERPL-MCNC: 4.7 MMOL/L — SIGNIFICANT CHANGE UP (ref 3.5–5.3)
POTASSIUM SERPL-SCNC: 4.7 MMOL/L — SIGNIFICANT CHANGE UP (ref 3.5–5.3)
PROT SERPL-MCNC: 7.1 G/DL — SIGNIFICANT CHANGE UP (ref 6.6–8.7)
RBC # BLD: 4.27 M/UL — SIGNIFICANT CHANGE UP (ref 3.8–5.2)
RBC # FLD: 13.5 % — SIGNIFICANT CHANGE UP (ref 10.3–14.5)
SAO2 % BLDA: 96 % — SIGNIFICANT CHANGE UP (ref 95–99)
SODIUM SERPL-SCNC: 140 MMOL/L — SIGNIFICANT CHANGE UP (ref 135–145)
WBC # BLD: 16.97 K/UL — HIGH (ref 3.8–10.5)
WBC # FLD AUTO: 16.97 K/UL — HIGH (ref 3.8–10.5)

## 2021-05-14 PROCEDURE — 99233 SBSQ HOSP IP/OBS HIGH 50: CPT

## 2021-05-14 PROCEDURE — 99232 SBSQ HOSP IP/OBS MODERATE 35: CPT

## 2021-05-14 RX ORDER — IPRATROPIUM/ALBUTEROL SULFATE 18-103MCG
3 AEROSOL WITH ADAPTER (GRAM) INHALATION EVERY 6 HOURS
Refills: 0 | Status: DISCONTINUED | OUTPATIENT
Start: 2021-05-14 | End: 2021-05-15

## 2021-05-14 RX ADMIN — GABAPENTIN 900 MILLIGRAM(S): 400 CAPSULE ORAL at 21:14

## 2021-05-14 RX ADMIN — ENOXAPARIN SODIUM 40 MILLIGRAM(S): 100 INJECTION SUBCUTANEOUS at 11:22

## 2021-05-14 RX ADMIN — OXYCODONE AND ACETAMINOPHEN 1 TABLET(S): 5; 325 TABLET ORAL at 13:45

## 2021-05-14 RX ADMIN — BUDESONIDE AND FORMOTEROL FUMARATE DIHYDRATE 2 PUFF(S): 160; 4.5 AEROSOL RESPIRATORY (INHALATION) at 20:47

## 2021-05-14 RX ADMIN — Medication 40 MILLIGRAM(S): at 06:00

## 2021-05-14 RX ADMIN — OXYCODONE AND ACETAMINOPHEN 1 TABLET(S): 5; 325 TABLET ORAL at 06:34

## 2021-05-14 RX ADMIN — Medication 1 MILLIGRAM(S): at 11:23

## 2021-05-14 RX ADMIN — TIOTROPIUM BROMIDE 1 CAPSULE(S): 18 CAPSULE ORAL; RESPIRATORY (INHALATION) at 08:32

## 2021-05-14 RX ADMIN — Medication 1 SPRAY(S): at 06:00

## 2021-05-14 RX ADMIN — Medication 1 SPRAY(S): at 11:23

## 2021-05-14 RX ADMIN — OXYCODONE AND ACETAMINOPHEN 1 TABLET(S): 5; 325 TABLET ORAL at 20:30

## 2021-05-14 RX ADMIN — OXYCODONE AND ACETAMINOPHEN 1 TABLET(S): 5; 325 TABLET ORAL at 12:45

## 2021-05-14 RX ADMIN — Medication 40 MILLIGRAM(S): at 17:05

## 2021-05-14 RX ADMIN — GABAPENTIN 900 MILLIGRAM(S): 400 CAPSULE ORAL at 06:00

## 2021-05-14 RX ADMIN — Medication 1 SPRAY(S): at 17:05

## 2021-05-14 RX ADMIN — OXYCODONE AND ACETAMINOPHEN 1 TABLET(S): 5; 325 TABLET ORAL at 19:30

## 2021-05-14 RX ADMIN — Medication 2 MILLIGRAM(S): at 17:05

## 2021-05-14 RX ADMIN — GABAPENTIN 900 MILLIGRAM(S): 400 CAPSULE ORAL at 12:45

## 2021-05-14 RX ADMIN — BUDESONIDE AND FORMOTEROL FUMARATE DIHYDRATE 2 PUFF(S): 160; 4.5 AEROSOL RESPIRATORY (INHALATION) at 08:33

## 2021-05-14 RX ADMIN — Medication 2 MILLIGRAM(S): at 11:23

## 2021-05-14 NOTE — PHYSICAL THERAPY INITIAL EVALUATION ADULT - ADDITIONAL COMMENTS
as per pt: resides in the private house with 5 steps to enter (+) rail  and 15 steps to the bedroom, owns S[employer], not forward with information about social support upon D/C home

## 2021-05-14 NOTE — DISCHARGE NOTE NURSING/CASE MANAGEMENT/SOCIAL WORK - PATIENT PORTAL LINK FT
You can access the FollowMyHealth Patient Portal offered by [facility] by registering at the following website: [email or web address]. By joining [facility]’s FollowMyHealth portal, you will also be able to view your health information using other applications (apps) compatible with our system.

## 2021-05-14 NOTE — PROGRESS NOTE ADULT - ASSESSMENT
Severe COPD, mild emphysema, sig bronchial wall thickening on CT  nicotine addiction, no sig hypercapnia  improving  wean  medrol, prednisone taper  smoking cessation discussed, including avoidance and risk with 02  Needs home 02 eval  home neb ( Duo neb 2-3 x daily)  Favor Generic advair 250 and spiriva  Pulmonary follow up out pt

## 2021-05-14 NOTE — PROGRESS NOTE ADULT - ASSESSMENT
59 y/o female with PMH of RA on MTX, chronic back pain, neuropathy, active cigarette use, came to the ED complaining of shortness of breath x 2 days duration. Patient said her symptom has worsen today, she used her inhaler several time with minimal relief, used her friend's oxygen as well. She noted occasional cough productive of clear sputum.     Acute hypoxic respiratory failure 2/2 COPD exacerbation   Mg and Solumedrol given in the ED,   Tapered IV steroids  Duoneb standing and Albuterol PRN, Spiriva, Symbicort  Azithromycin for 3 total doses  NC - Titrate down as tolerated  ABG reviewed  As per patient she has not been diagnosed with COPD, her PCP placed her on Symbicort for shortness of breath   Pulmonary recs appreciated  Will likely need PFT outpatient.   CTA: No PE  TTE: Reviewed    Leukocytosis  Likely secondary to steroids  Will continue to monitor     RA   On Methotrexate 15mg every Tuesday     Tobacco use  Cessation advised   Nicotine lozenges PRN    Chronic back pain/neuropathy   Gabapentin 900mg tid   Norco 10/325mg qid (non-formulary) will give percocet as needed     LBBB  EKG reviewed - Similar EKG in 2016  As per patient, she follows with her PCP and her cardiologist for this outpatient   No current chest pain or any other findings concerning for ischemia   TTE reviewed  Outpatient Cardiology f/u     Supportive   DVT prophylaxis: Lovenox   Diet: regular     Discussed with patients sister Svitlana and answered all questions on 5/14/2021    Dispo: Pending course, DC likely tomorrow if oxygen saturation remains well

## 2021-05-14 NOTE — DISCHARGE NOTE NURSING/CASE MANAGEMENT/SOCIAL WORK - NSDCFUADDAPPT_GEN_ALL_CORE_FT
Pt declines [facility]/homecare/she'll make all her own appts for follow up.  All new meds including neb machine to [facility]/decline meds to bed.  Pt declines [facility]/homecare/she'll make all her own appts for follow up.  All new meds including neb machine to [facility]/decline meds to bed.   Pt given POX

## 2021-05-14 NOTE — PROGRESS NOTE ADULT - TIME BILLING
greater than 50% of time spent reviewing labs, notes, orders and radiographs, coordinating care  pt, social service
patient

## 2021-05-14 NOTE — PROGRESS NOTE ADULT - SUBJECTIVE AND OBJECTIVE BOX
[facility] Division of Hospital Medicine    Chief Complaint:  Patient is a 60y old  Female who presents with a chief complaint of COPD ex (13 May 2021 10:11)      SUBJECTIVE / OVERNIGHT EVENTS:  Patient was seen and examined at bedside. States her breathing is much improved. She was hypoxic yesterday but improving today.  Patient denies chest pain, SOB, abd pain, N/V, fever, chills, dysuria or any other complaints. All remainder ROS negative.     MEDICATIONS  (STANDING):  budesonide 160 MICROgram(s)/formoterol 4.5 MICROgram(s) Inhaler 2 Puff(s) Inhalation two times a day  enoxaparin Injectable 40 milliGRAM(s) SubCutaneous daily  folic acid 1 milliGRAM(s) Oral daily  gabapentin 900 milliGRAM(s) Oral three times a day  methotrexate 15 milliGRAM(s) Oral <User Schedule>  methylPREDNISolone sodium succinate Injectable 40 milliGRAM(s) IV Push every 12 hours  sodium chloride 0.65% Nasal 1 Spray(s) Both Nostrils every 6 hours  tiotropium 18 MICROgram(s) Capsule 1 Capsule(s) Inhalation daily    MEDICATIONS  (PRN):  ALBUTerol    90 MICROgram(s) HFA Inhaler 2 Puff(s) Inhalation every 4 hours PRN Shortness of Breath  ALBUTerol    90 MICROgram(s) HFA Inhaler 2 Puff(s) Inhalation every 4 hours PRN Shortness of Breath  nicotine  Polacrilex Lozenge 2 milliGRAM(s) Oral every 2 hours PRN Patients request  oxycodone    5 mG/acetaminophen 325 mG 1 Tablet(s) Oral every 6 hours PRN Severe Pain (7 - 10)        I&O's Summary      PHYSICAL EXAM:  Vital Signs Last 24 Hrs  T(C): 36.9 (14 May 2021 10:35), Max: 36.9 (13 May 2021 16:52)  T(F): 98.4 (14 May 2021 10:35), Max: 98.5 (13 May 2021 16:52)  HR: 57 (14 May 2021 10:35) (57 - 90)  BP: 105/63 (14 May 2021 10:35) (95/54 - 120/76)  BP(mean): 76 (13 May 2021 16:52) (76 - 76)  RR: 18 (14 May 2021 10:35) (18 - 87)  SpO2: 94% (14 May 2021 07:22) (84% - 96%)      Constitutional: NAD, Resting  ENT: Supple, No JVD  Lungs:Improving breath sounds, no wheezing  Cardio: RRR, S1/S2, No murmur  Abdomen: Soft, Nontender, Nondistended; Bowel sounds present  Extremities: No calf tenderness, No pitting edema  Musculoskeletal:   No clubbing or cyanosis of digits; no joint swelling or tenderness to palpation  Psych: Calm, cooperative affect appropriate  Neuro: Awake and alert  Skin: No rashes; no palpable lesions    LABS:                        13.4   16.97 )-----------( 246      ( 14 May 2021 07:50 )             42.3     05-14    140  |  99  |  20.0  ----------------------------<  113<H>  4.7   |  30.0<H>  |  0.53    Ca    9.4      14 May 2021 07:50    TPro  7.1  /  Alb  4.2  /  TBili  0.4  /  DBili  x   /  AST  16  /  ALT  11  /  AlkPhos  79  05-14              CAPILLARY BLOOD GLUCOSE            RADIOLOGY REVIEWED  
[facility] Division of Hospital Medicine    Chief Complaint:  Patient is a 60y old  Female who presents with a chief complaint of COPD ex (11 May 2021 22:01)      SUBJECTIVE / OVERNIGHT EVENTS:  Patient was seen and examined at bedside. States that her breathing is improved but states that she is not moving around yet. Currently remains on 3LNC   Patient denies chest pain, abd pain, N/V, fever, chills, dysuria or any other complaints. All remainder ROS negative.     MEDICATIONS  (STANDING):  albuterol/ipratropium for Nebulization 3 milliLiter(s) Nebulizer every 6 hours  albuterol/ipratropium for Nebulization.. 3 milliLiter(s) Nebulizer every 20 minutes  enoxaparin Injectable 40 milliGRAM(s) SubCutaneous daily  folic acid 1 milliGRAM(s) Oral daily  gabapentin 900 milliGRAM(s) Oral three times a day  methotrexate 15 milliGRAM(s) Oral <User Schedule>  methylPREDNISolone sodium succinate Injectable 40 milliGRAM(s) IV Push every 6 hours    MEDICATIONS  (PRN):  ALBUTerol    90 MICROgram(s) HFA Inhaler 2 Puff(s) Inhalation every 4 hours PRN Shortness of Breath  nicotine  Polacrilex Lozenge 2 milliGRAM(s) Oral every 2 hours PRN Patients request  oxycodone    5 mG/acetaminophen 325 mG 1 Tablet(s) Oral every 6 hours PRN Severe Pain (7 - 10)        I&O's Summary      PHYSICAL EXAM:  Vital Signs Last 24 Hrs  T(C): 36.8 (12 May 2021 07:38), Max: 37.1 (11 May 2021 17:48)  T(F): 98.2 (12 May 2021 07:38), Max: 98.7 (11 May 2021 17:48)  HR: 96 (12 May 2021 07:38) (92 - 122)  BP: 116/68 (12 May 2021 07:38) (116/68 - 144/88)  BP(mean): --  RR: 20 (12 May 2021 07:38) (20 - 24)  SpO2: 95% (12 May 2021 07:38) (90% - 97%)      Constitutional: NAD, Resting on 3LNC  ENT: Supple, No JVD  Lungs: Decreased air entry b/l  Cardio: RRR, S1/S2, No murmur  Abdomen: Soft, Nontender, Nondistended; Bowel sounds present  Extremities: No calf tenderness, No pitting edema  Musculoskeletal:   No clubbing or cyanosis of digits; no joint swelling or tenderness to palpation  Psych: Calm, cooperative affect appropriate  Neuro: Awake and alert  Skin: No rashes; no palpable lesions    LABS:                        14.2   12.90 )-----------( 181      ( 12 May 2021 07:07 )             44.6     05-12    140  |  103  |  13.0  ----------------------------<  148<H>  4.6   |  27.0  |  0.52    Ca    9.9      12 May 2021 07:07    TPro  7.5  /  Alb  4.4  /  TBili  0.2<L>  /  DBili  x   /  AST  29  /  ALT  10  /  AlkPhos  86  05-11      CARDIAC MARKERS ( 11 May 2021 17:58 )  x     / <0.01 ng/mL / x     / x     / x              CAPILLARY BLOOD GLUCOSE      POCT Blood Glucose.: 142 mg/dL (12 May 2021 07:28)        RADIOLOGY REVIEWED  
[facility] Division of Hospital Medicine    Chief Complaint:  Patient is a 60y old  Female who presents with a chief complaint of COPD ex (12 May 2021 11:27)      SUBJECTIVE / OVERNIGHT EVENTS:  Patient was seen and examined at bedside. States that her breathing has improved. Still has not moved around.   Patient denies chest pain, SOB, abd pain, N/V, fever, chills, dysuria or any other complaints. All remainder ROS negative.     MEDICATIONS  (STANDING):  albuterol/ipratropium for Nebulization.. 3 milliLiter(s) Nebulizer every 20 minutes  azithromycin   Tablet 500 milliGRAM(s) Oral daily  enoxaparin Injectable 40 milliGRAM(s) SubCutaneous daily  folic acid 1 milliGRAM(s) Oral daily  gabapentin 900 milliGRAM(s) Oral three times a day  methotrexate 15 milliGRAM(s) Oral <User Schedule>  methylPREDNISolone sodium succinate Injectable 40 milliGRAM(s) IV Push every 12 hours    MEDICATIONS  (PRN):  ALBUTerol    90 MICROgram(s) HFA Inhaler 2 Puff(s) Inhalation every 4 hours PRN Shortness of Breath  ALBUTerol    90 MICROgram(s) HFA Inhaler 2 Puff(s) Inhalation every 4 hours PRN Shortness of Breath  nicotine  Polacrilex Lozenge 2 milliGRAM(s) Oral every 2 hours PRN Patients request  oxycodone    5 mG/acetaminophen 325 mG 1 Tablet(s) Oral every 6 hours PRN Severe Pain (7 - 10)        I&O's Summary      PHYSICAL EXAM:  Vital Signs Last 24 Hrs  T(C): 36.4 (13 May 2021 04:29), Max: 36.8 (12 May 2021 15:38)  T(F): 97.5 (13 May 2021 04:29), Max: 98.3 (12 May 2021 20:46)  HR: 89 (13 May 2021 04:29) (80 - 93)  BP: 99/62 (13 May 2021 04:29) (99/62 - 122/64)  BP(mean): --  RR: 18 (13 May 2021 04:29) (18 - 20)  SpO2: 94% (13 May 2021 07:30) (93% - 98%)      Constitutional: NAD, Resting, On NC  ENT: Supple, No JVD  Lungs: Improved breath sounds, No wheezing  Cardio: RRR, S1/S2, No murmur  Abdomen: Soft, Nontender, Nondistended; Bowel sounds present  Extremities: No calf tenderness, No pitting edema  Musculoskeletal:   No clubbing or cyanosis of digits; no joint swelling or tenderness to palpation  Psych: Calm, cooperative affect appropriate  Neuro: Awake and alert  Skin: No rashes; no palpable lesions    LABS:                        13.6   23.24 )-----------( 253      ( 13 May 2021 08:14 )             41.7     05-13    139  |  101  |  15.0  ----------------------------<  129<H>  4.6   |  29.0  |  0.49<L>    Ca    9.6      13 May 2021 08:14    TPro  7.2  /  Alb  4.2  /  TBili  0.3<L>  /  DBili  x   /  AST  16  /  ALT  10  /  AlkPhos  82  05-13      CARDIAC MARKERS ( 11 May 2021 17:58 )  x     / <0.01 ng/mL / x     / x     / x              CAPILLARY BLOOD GLUCOSE            RADIOLOGY REVIEWED  
PULMONARY   PROGRESS NOTE  CORBY HARPER    MRN   [id number]    Patient is a 60y old  Female who presents with a chief complaint of COPD ex (12 May 2021 11:27)      BRIEF HOSPITAL COURSE: *59y/o pleasant female  smoker 1 pack day x 40 years still smoking  worked as  in past now disability  h/o severe COPD last seen by Dr Iglesias 2015  PFT FEV1 0.77 28 %    on symbicort prn only - h/o  RA on MTX chronic back pain from surgery,  neuropathy,   admitted via ED 5/11/2021   with sob woke up at night  with sob acute onset  worsening over the day  used her albuterol  several times with  no relief   Chronic cough feels stuck  however with symbicort can expectorate thick brown mucous    -  used her friends oxygen as well     -per EMS sats room air  75%-- diffuse wheezing in ED   **    Events last 24 hours: *-improved overall  steroids tapered this am  no chest pain ctpa neg PE    REVIEW OF SYSTEMS     CONSTITUTIONAL   no fevers  no loss of appetite  no weight loss   HEENT  no sore throat   no ringing in ears  NECK   no pain   RESPIRATORY  see HPI   CARDIOVASCULAR  no chest  pain no palpitations   GASTROINTESTINAL no vomiting  no diarrhea    no   gerd   MUSCULOSKELETAL  no joint pains    no  back pain   SKIN   no rash   no itchiness   GENITOURINARY  no dysuria   HEME    no bleeding or bruising   ENDOCRINE     no   warmth   no  sweating   no  cold intolerance   NEUROLOGIC  no tremors  no seizures  no    weakness    PSYCHIATRIC   no mood disorder    no delirium   **    PAST MEDICAL & SURGICAL HISTORY:  Tumors  Spinal tumor    Rheumatoid arthritis, involving unspecified site, unspecified whether rheumatoid factor present    Neuropathy    Cervical fusion syndrome  C2-C3    S/P cervical discectomy    S/P tonsillectomy    S/P eye surgery  Right Cataract secondary to trauma          Medications:  azithromycin   Tablet 500 milliGRAM(s) Oral daily      ALBUTerol    90 MICROgram(s) HFA Inhaler 2 Puff(s) Inhalation every 4 hours PRN  ALBUTerol    90 MICROgram(s) HFA Inhaler 2 Puff(s) Inhalation every 4 hours PRN  albuterol/ipratropium for Nebulization.. 3 milliLiter(s) Nebulizer every 20 minutes    gabapentin 900 milliGRAM(s) Oral three times a day  oxycodone    5 mG/acetaminophen 325 mG 1 Tablet(s) Oral every 6 hours PRN    methotrexate 15 milliGRAM(s) Oral <User Schedule>    enoxaparin Injectable 40 milliGRAM(s) SubCutaneous daily        methylPREDNISolone sodium succinate Injectable 40 milliGRAM(s) IV Push every 12 hours    folic acid 1 milliGRAM(s) Oral daily        nicotine  Polacrilex Lozenge 2 milliGRAM(s) Oral every 2 hours PRN          ICU Vital Signs Last 24 Hrs  T(C): 36.4 (13 May 2021 04:29), Max: 36.8 (12 May 2021 15:38)  T(F): 97.5 (13 May 2021 04:29), Max: 98.3 (12 May 2021 20:46)  HR: 89 (13 May 2021 04:29) (80 - 93)  BP: 99/62 (13 May 2021 04:29) (99/62 - 122/64)  BP(mean): --  ABP: --  ABP(mean): --  RR: 18 (13 May 2021 04:29) (18 - 20)  SpO2: 94% (13 May 2021 07:30) (93% - 98%)          I&O's Detail        LABS:                        13.6   23.24 )-----------( 253      ( 13 May 2021 08:14 )             41.7     05-13    139  |  101  |  15.0  ----------------------------<  129<H>  4.6   |  29.0  |  0.49<L>    Ca    9.6      13 May 2021 08:14    TPro  7.2  /  Alb  4.2  /  TBili  0.3<L>  /  DBili  x   /  AST  16  /  ALT  10  /  AlkPhos  82  05-13      CARDIAC MARKERS ( 11 May 2021 17:58 )  x     / <0.01 ng/mL / x     / x     / x          CAPILLARY BLOOD GLUCOSE      POCT Blood Glucose.: 142 mg/dL (12 May 2021 07:28)        CULTURES:      Physical Examination:    General:   thin  f no distress in bed     HEENT: Pupils equal, reactive to light.  Symmetric. sclera anicteric moist     PULM: Clear to auscultation bilaterally, no significant sputum production    NECK: Supple, no lymphadenopathy, trachea midline    CVS: Regular rate and rhythm, no murmurs, rubs, or gallops    ABD: Soft, nondistended, nontender, normoactive bowel sounds, no masses    EXT: No edema, nontender    SKIN: Warm and well perfused, no rashes noted.    NEURO: Alert, oriented, interactive, nonfocal    DEVICES:     RADIOLOGY: **IMPRESSION:  No pulmonary embolism.  SPEEDY MONTENEGRO MD; Attending Radiologist  This document has been electronically signed. May 12 2021  1:28PM  *      
PULMONARY PROGRESS NOTE      SOFIA HARPER-[id number]    Patient is a 60y old  Female who presents with a chief complaint of COPD ex (14 May 2021 12:15)  59 y/o female with PMH of RA on MTX, chronic back pain, neuropathy, active cigarette use, came to the ED complaining of shortness of breath x 2 days duration. Patient said her symptom has worsen today, she used her inhaler several time with minimal relief, used her friend's oxygen as well. She noted occasional cough productive of clear sputum. She has no fever, chills, chest pain, recent travel, sick contact, nausea, vomiting, abdominal pain. She noted shortness of breath in the past and cough due to bronchitis but this is worse. Of note, she is s/p covid vaccine 4/20 (2nd dose).       INTERVAL HPI/OVERNIGHT EVENTS:  improving  no CP   decreasing dyspnea  MEDICATIONS  (STANDING):  budesonide 160 MICROgram(s)/formoterol 4.5 MICROgram(s) Inhaler 2 Puff(s) Inhalation two times a day  enoxaparin Injectable 40 milliGRAM(s) SubCutaneous daily  folic acid 1 milliGRAM(s) Oral daily  gabapentin 900 milliGRAM(s) Oral three times a day  methotrexate 15 milliGRAM(s) Oral <User Schedule>  methylPREDNISolone sodium succinate Injectable 40 milliGRAM(s) IV Push every 12 hours  sodium chloride 0.65% Nasal 1 Spray(s) Both Nostrils every 6 hours  tiotropium 18 MICROgram(s) Capsule 1 Capsule(s) Inhalation daily      MEDICATIONS  (PRN):  ALBUTerol    90 MICROgram(s) HFA Inhaler 2 Puff(s) Inhalation every 4 hours PRN Shortness of Breath  ALBUTerol    90 MICROgram(s) HFA Inhaler 2 Puff(s) Inhalation every 4 hours PRN Shortness of Breath  nicotine  Polacrilex Lozenge 2 milliGRAM(s) Oral every 2 hours PRN Patients request  oxycodone    5 mG/acetaminophen 325 mG 1 Tablet(s) Oral every 6 hours PRN Severe Pain (7 - 10)      Allergies    No Known Allergies    Intolerances        PAST MEDICAL & SURGICAL HISTORY:  Tumors  Spinal tumor    Rheumatoid arthritis, involving unspecified site, unspecified whether rheumatoid factor present    Neuropathy    Cervical fusion syndrome  C2-C3    S/P cervical discectomy    S/P tonsillectomy    S/P eye surgery  Right Cataract secondary to trauma        SOCIAL HISTORY  Smoking History:       REVIEW OF SYSTEMS:    CONSTITUTIONAL:  No distress    HEENT:  Eyes:  No diplopia or blurred vision. ENT:  No earache, sore throat or runny nose.    CARDIOVASCULAR:  No pressure, squeezing, tightness, heaviness or aching about the chest; no palpitations.    RESPIRATORY:  see HPI    GASTROINTESTINAL:  No nausea, vomiting or diarrhea.    GENITOURINARY:  No dysuria, frequency or urgency.    NEUROLOGIC:  No paresthesias, fasciculations, seizures or weakness.    PSYCHIATRIC:  No disorder of thought or mood.    Vital Signs Last 24 Hrs  T(C): 36.9 (14 May 2021 10:35), Max: 36.9 (13 May 2021 16:52)  T(F): 98.4 (14 May 2021 10:35), Max: 98.5 (13 May 2021 16:52)  HR: 57 (14 May 2021 10:35) (57 - 90)  BP: 105/63 (14 May 2021 10:35) (95/54 - 120/76)  BP(mean): 76 (13 May 2021 16:52) (76 - 76)  RR: 18 (14 May 2021 10:35) (18 - 87)  SpO2: 94% (14 May 2021 07:22) (91% - 96%)    PHYSICAL EXAMINATION:    GENERAL: The patient is awake and alert in no apparent distress.     HEENT: Head is normocephalic and atraumatic. Extraocular muscles are intact. Mucous membranes are moist.    NECK: Supple.    LUNGS: Decreased air entry bilat  without wheezing, rales or rhonchi; respirations unlabored    HEART: Regular rate and rhythm without murmur.    ABDOMEN: Soft, nontender, and nondistended.      EXTREMITIES: Without any cyanosis, clubbing, rash, lesions or edema.    NEUROLOGIC: Grossly intact.    LABS:                        13.4   16.97 )-----------( 246      ( 14 May 2021 07:50 )             42.3     05-14    140  |  99  |  20.0  ----------------------------<  113<H>  4.7   |  30.0<H>  |  0.53    Ca    9.4      14 May 2021 07:50    TPro  7.1  /  Alb  4.2  /  TBili  0.4  /  DBili  x   /  AST  16  /  ALT  11  /  AlkPhos  79  05-14        ABG - ( 14 May 2021 04:48 )  pH, Arterial: 7.46  pH, Blood: x     /  pCO2: 48    /  pO2: 73    / HCO3: 32    / Base Excess: 8.5   /  SaO2: 96                      Serum Pro-Brain Natriuretic Peptide: 206 pg/mL (05-11-21 @ 17:58)      Procalcitonin, Serum: 0.05 ng/mL (05-13-21 @ 08:14)      MICROBIOLOGY:    RADIOLOGY & ADDITIONAL STUDIES:  CXR and CT scan reviewed, echo noted

## 2021-05-15 ENCOUNTER — TRANSCRIPTION ENCOUNTER (OUTPATIENT)
Age: 61
End: 2021-05-15

## 2021-05-15 VITALS
HEART RATE: 72 BPM | DIASTOLIC BLOOD PRESSURE: 64 MMHG | OXYGEN SATURATION: 93 % | RESPIRATION RATE: 18 BRPM | TEMPERATURE: 98 F | SYSTOLIC BLOOD PRESSURE: 110 MMHG

## 2021-05-15 PROCEDURE — 86256 FLUORESCENT ANTIBODY TITER: CPT

## 2021-05-15 PROCEDURE — 71045 X-RAY EXAM CHEST 1 VIEW: CPT

## 2021-05-15 PROCEDURE — 86803 HEPATITIS C AB TEST: CPT

## 2021-05-15 PROCEDURE — 85025 COMPLETE CBC W/AUTO DIFF WBC: CPT

## 2021-05-15 PROCEDURE — 83880 ASSAY OF NATRIURETIC PEPTIDE: CPT

## 2021-05-15 PROCEDURE — 82803 BLOOD GASES ANY COMBINATION: CPT

## 2021-05-15 PROCEDURE — 80053 COMPREHEN METABOLIC PANEL: CPT

## 2021-05-15 PROCEDURE — 96374 THER/PROPH/DIAG INJ IV PUSH: CPT

## 2021-05-15 PROCEDURE — U0005: CPT

## 2021-05-15 PROCEDURE — 99285 EMERGENCY DEPT VISIT HI MDM: CPT | Mod: 25

## 2021-05-15 PROCEDURE — 86769 SARS-COV-2 COVID-19 ANTIBODY: CPT

## 2021-05-15 PROCEDURE — 82962 GLUCOSE BLOOD TEST: CPT

## 2021-05-15 PROCEDURE — 82785 ASSAY OF IGE: CPT

## 2021-05-15 PROCEDURE — 82103 ALPHA-1-ANTITRYPSIN TOTAL: CPT

## 2021-05-15 PROCEDURE — 82104 ALPHA-1-ANTITRYPSIN PHENO: CPT

## 2021-05-15 PROCEDURE — U0003: CPT

## 2021-05-15 PROCEDURE — 94640 AIRWAY INHALATION TREATMENT: CPT

## 2021-05-15 PROCEDURE — 36415 COLL VENOUS BLD VENIPUNCTURE: CPT

## 2021-05-15 PROCEDURE — 84145 PROCALCITONIN (PCT): CPT

## 2021-05-15 PROCEDURE — C8929: CPT

## 2021-05-15 PROCEDURE — 93005 ELECTROCARDIOGRAM TRACING: CPT

## 2021-05-15 PROCEDURE — 80048 BASIC METABOLIC PNL TOTAL CA: CPT

## 2021-05-15 PROCEDURE — 84484 ASSAY OF TROPONIN QUANT: CPT

## 2021-05-15 PROCEDURE — 85027 COMPLETE CBC AUTOMATED: CPT

## 2021-05-15 PROCEDURE — 96375 TX/PRO/DX INJ NEW DRUG ADDON: CPT

## 2021-05-15 PROCEDURE — 99239 HOSP IP/OBS DSCHRG MGMT >30: CPT

## 2021-05-15 PROCEDURE — 71275 CT ANGIOGRAPHY CHEST: CPT

## 2021-05-15 RX ORDER — ALBUTEROL 90 UG/1
2 AEROSOL, METERED ORAL
Qty: 1 | Refills: 0
Start: 2021-05-15 | End: 2021-06-13

## 2021-05-15 RX ORDER — BUDESONIDE AND FORMOTEROL FUMARATE DIHYDRATE 160; 4.5 UG/1; UG/1
2 AEROSOL RESPIRATORY (INHALATION)
Qty: 1 | Refills: 0
Start: 2021-05-15 | End: 2021-06-13

## 2021-05-15 RX ORDER — IPRATROPIUM/ALBUTEROL SULFATE 18-103MCG
3 AEROSOL WITH ADAPTER (GRAM) INHALATION
Qty: 360 | Refills: 0
Start: 2021-05-15 | End: 2021-06-13

## 2021-05-15 RX ORDER — TIOTROPIUM BROMIDE 18 UG/1
1 CAPSULE ORAL; RESPIRATORY (INHALATION)
Qty: 30 | Refills: 0
Start: 2021-05-15 | End: 2021-06-13

## 2021-05-15 RX ORDER — FLUTICASONE PROPIONATE AND SALMETEROL 50; 250 UG/1; UG/1
1 POWDER ORAL; RESPIRATORY (INHALATION)
Qty: 1 | Refills: 0
Start: 2021-05-15 | End: 2021-06-13

## 2021-05-15 RX ORDER — UMECLIDINIUM 62.5 UG/1
1 AEROSOL, POWDER ORAL
Qty: 30 | Refills: 0
Start: 2021-05-15 | End: 2021-06-13

## 2021-05-15 RX ADMIN — Medication 1 SPRAY(S): at 12:11

## 2021-05-15 RX ADMIN — Medication 1 MILLIGRAM(S): at 12:12

## 2021-05-15 RX ADMIN — TIOTROPIUM BROMIDE 1 CAPSULE(S): 18 CAPSULE ORAL; RESPIRATORY (INHALATION) at 08:45

## 2021-05-15 RX ADMIN — Medication 3 MILLILITER(S): at 08:45

## 2021-05-15 RX ADMIN — Medication 1 SPRAY(S): at 05:24

## 2021-05-15 RX ADMIN — OXYCODONE AND ACETAMINOPHEN 1 TABLET(S): 5; 325 TABLET ORAL at 05:30

## 2021-05-15 RX ADMIN — BUDESONIDE AND FORMOTEROL FUMARATE DIHYDRATE 2 PUFF(S): 160; 4.5 AEROSOL RESPIRATORY (INHALATION) at 08:36

## 2021-05-15 RX ADMIN — Medication 40 MILLIGRAM(S): at 05:24

## 2021-05-15 RX ADMIN — GABAPENTIN 900 MILLIGRAM(S): 400 CAPSULE ORAL at 05:23

## 2021-05-15 NOTE — DISCHARGE NOTE PROVIDER - CARE PROVIDER_API CALL
Mario Gentile (DO)  Critical Care Medicine; Internal Medicine; Pulmonary Disease  [address] [place] [address]  Phone: [phone]  Fax: [phone]  Follow Up Time: 1 week

## 2021-05-15 NOTE — DISCHARGE NOTE PROVIDER - NSDCCPCAREPLAN_GEN_ALL_CORE_FT
PRINCIPAL DISCHARGE DIAGNOSIS  Diagnosis: Chronic obstructive pulmonary disease, unspecified COPD type  Assessment and Plan of Treatment: Please use inhalers/Medications as prescribed. Please refrain from using any more tobacco products. Please follow up with pulmonary outpatient.      SECONDARY DISCHARGE DIAGNOSES  Diagnosis: Hypoxia  Assessment and Plan of Treatment: Please use oxygen as needed for ambulation. Please do not smoke or use any matches/lighter near the oxygen. Please follow up with pulmonary outpatient.

## 2021-05-15 NOTE — DISCHARGE NOTE PROVIDER - NSDCFUADDAPPT_GEN_ALL_CORE_FT
Pt declines [facility]/homecare/she'll make all her own appts for follow up.  All new meds including neb machine to [facility]/decline meds to bed.   Pt given POX

## 2021-05-15 NOTE — DISCHARGE NOTE PROVIDER - HOSPITAL COURSE
61 y/o female with PMH of RA on MTX, chronic back pain, neuropathy, active cigarette use, came to the ED complaining of shortness of breath x 2 days duration. Patient said her symptom has worsen today, she used her inhaler several time with minimal relief, used her friend's oxygen as well. She noted occasional cough productive of clear sputum. She has no fever, chills, chest pain, recent travel, sick contact, nausea, vomiting, abdominal pain. She noted shortness of breath in the past and cough due to bronchitis but this is worse. Of note, she is s/p covid vaccine 4/20 (2nd dose).  She was placed on azithromycin, Steroids and duonebs. Patient had a CTA of the chest which was negative for PE but showed emphysematous lungs. Patient had a TTE with a normal EF. Patient was seen by pulmonary and her inhalers were optimized. Seen and evaluated by PT. Recommended Home. Patient desaturated upon ambulation. Patient will require home oxygen. Patients wheezing resolved and her symptoms improved. Patient was extensively counselled on refraining from smoking and that she can not use any tobacco/lighting with fire near the oxygen. Patient understood and states that she will not. Medically stable for discharge with outpatient follow up.     PHYSICAL EXAM:  Vital Signs Last 24 Hrs  T(F): 97.7 (15 May 2021 05:07), Max: 98.4 (14 May 2021 10:35)  HR: 70 (15 May 2021 08:48) (57 - 73)  BP: 119/62 (15 May 2021 05:07) (102/66 - 119/62)  RR: 18 (15 May 2021 05:07) (18 - 18)  SpO2: 93% (15 May 2021 05:07) (93% - 96%)    GENERAL: NAD, Resting in bed  Eyes: EOMI, PERRLA  ENMT: Conjunctiva and sclera clear; supple neck, No JVD  Cardiovascular: S1,S2, RRR, No murmur  Respiratory: CTA B/L, Non-labored breathing  GI: Bowel sounds present; Soft, Nontender, Nondistended. No hepatomegaly  Genitourinary: Deferred  Skin:  no breakdowns, ulcers or discharge, No rashes or lesions  Neurology: Alert & Oriented X3, non-focal and spontaneous movements of all extremities, CN 2 to 12 grossly intact   Psych: Appropriate mood and affect, calm, pleasant, Responds appropriately to questions      Time spent on patients discharge 32 minutes

## 2021-05-15 NOTE — DISCHARGE NOTE PROVIDER - NSDCMRMEDTOKEN_GEN_ALL_CORE_FT
albuterol 90 mcg/inh inhalation aerosol: 2 puff(s) inhaled every 4 hours, As needed, Shortness of Breath  folic acid 1 mg oral tablet: 1 tab(s) orally once a day  gabapentin 600 mg oral tablet: 1 tab(s) orally 3 times a day  Incruse Ellipta 62.5 mcg/inh inhalation powder: 1  inhaled every 24 hours   ipratropium-albuterol 0.5 mg-2.5 mg/3 mLinhalation solution: 3 milliliter(s) by nebulizer 4 times a day, As Needed     ICD 10: COPD J44.9  methotrexate 2.5 mg oral tablet: 6 tab(s) orally every 7 days on Tuesday   Nebulizer:   Norco 10 mg-325 mg oral tablet: 1 tab(s) orally every 6 hours, As Needed  predniSONE 10 mg oral tablet: 4 tab(s) orally once a day x 3 days  3 tab(s) orally once a day x 3 days  2 tab(s) orally once a day x 3 days  1 tab(s) orally once a day x 3 days  Symbicort 160 mcg-4.5 mcg/inh inhalation aerosol: 2 puff(s) inhaled 2 times a day

## 2021-05-18 LAB
A1AT PHENOTYP SERPL-IMP: SIGNIFICANT CHANGE UP
A1AT SERPL-MCNC: 125 MG/DL — SIGNIFICANT CHANGE UP (ref 101–187)

## 2021-08-20 PROBLEM — G62.9 POLYNEUROPATHY, UNSPECIFIED: Chronic | Status: ACTIVE | Noted: 2021-05-11

## 2021-08-20 PROBLEM — M06.9 RHEUMATOID ARTHRITIS, UNSPECIFIED: Chronic | Status: ACTIVE | Noted: 2021-05-11

## 2021-09-01 ENCOUNTER — OUTPATIENT (OUTPATIENT)
Dept: OUTPATIENT SERVICES | Facility: HOSPITAL | Age: 61
LOS: 1 days | End: 2021-09-01
Payer: MEDICARE

## 2021-09-01 DIAGNOSIS — Z90.89 ACQUIRED ABSENCE OF OTHER ORGANS: Chronic | ICD-10-CM

## 2021-09-01 DIAGNOSIS — Z98.89 OTHER SPECIFIED POSTPROCEDURAL STATES: Chronic | ICD-10-CM

## 2021-09-01 DIAGNOSIS — Q76.1 KLIPPEL-FEIL SYNDROME: Chronic | ICD-10-CM

## 2021-09-14 ENCOUNTER — EMERGENCY (EMERGENCY)
Facility: HOSPITAL | Age: 61
LOS: 1 days | Discharge: HOME | End: 2021-09-14
Attending: EMERGENCY MEDICINE
Payer: MEDICARE

## 2021-09-14 VITALS — OXYGEN SATURATION: 97 % | HEART RATE: 96 BPM

## 2021-09-14 VITALS — OXYGEN SATURATION: 81 % | RESPIRATION RATE: 30 BRPM | HEIGHT: 65 IN

## 2021-09-14 DIAGNOSIS — Z98.89 OTHER SPECIFIED POSTPROCEDURAL STATES: Chronic | ICD-10-CM

## 2021-09-14 DIAGNOSIS — Z90.89 ACQUIRED ABSENCE OF OTHER ORGANS: Chronic | ICD-10-CM

## 2021-09-14 DIAGNOSIS — Q76.1 KLIPPEL-FEIL SYNDROME: Chronic | ICD-10-CM

## 2021-09-14 LAB
ALBUMIN SERPL ELPH-MCNC: 4.6 G/DL — SIGNIFICANT CHANGE UP (ref 3.3–5.2)
ALP SERPL-CCNC: 89 U/L — SIGNIFICANT CHANGE UP (ref 40–120)
ALT FLD-CCNC: 11 U/L — SIGNIFICANT CHANGE UP
ANION GAP SERPL CALC-SCNC: 14 MMOL/L — SIGNIFICANT CHANGE UP (ref 5–17)
AST SERPL-CCNC: 19 U/L — SIGNIFICANT CHANGE UP
BILIRUB SERPL-MCNC: 0.3 MG/DL — LOW (ref 0.4–2)
BUN SERPL-MCNC: 18.4 MG/DL — SIGNIFICANT CHANGE UP (ref 8–20)
CALCIUM SERPL-MCNC: 9.3 MG/DL — SIGNIFICANT CHANGE UP (ref 8.6–10.2)
CHLORIDE SERPL-SCNC: 101 MMOL/L — SIGNIFICANT CHANGE UP (ref 98–107)
CO2 SERPL-SCNC: 27 MMOL/L — SIGNIFICANT CHANGE UP (ref 22–29)
CREAT SERPL-MCNC: 0.64 MG/DL — SIGNIFICANT CHANGE UP (ref 0.5–1.3)
GLUCOSE SERPL-MCNC: 122 MG/DL — HIGH (ref 70–99)
HCT VFR BLD CALC: 44.7 % — SIGNIFICANT CHANGE UP (ref 34.5–45)
HGB BLD-MCNC: 13.9 G/DL — SIGNIFICANT CHANGE UP (ref 11.5–15.5)
MCHC RBC-ENTMCNC: 31 PG — SIGNIFICANT CHANGE UP (ref 27–34)
MCHC RBC-ENTMCNC: 31.1 GM/DL — LOW (ref 32–36)
MCV RBC AUTO: 99.8 FL — SIGNIFICANT CHANGE UP (ref 80–100)
PLATELET # BLD AUTO: 256 K/UL — SIGNIFICANT CHANGE UP (ref 150–400)
POTASSIUM SERPL-MCNC: 4.6 MMOL/L — SIGNIFICANT CHANGE UP (ref 3.5–5.3)
POTASSIUM SERPL-SCNC: 4.6 MMOL/L — SIGNIFICANT CHANGE UP (ref 3.5–5.3)
PROT SERPL-MCNC: 7.8 G/DL — SIGNIFICANT CHANGE UP (ref 6.6–8.7)
RBC # BLD: 4.48 M/UL — SIGNIFICANT CHANGE UP (ref 3.8–5.2)
RBC # FLD: 13.3 % — SIGNIFICANT CHANGE UP (ref 10.3–14.5)
SODIUM SERPL-SCNC: 142 MMOL/L — SIGNIFICANT CHANGE UP (ref 135–145)
WBC # BLD: 12.17 K/UL — HIGH (ref 3.8–10.5)
WBC # FLD AUTO: 12.17 K/UL — HIGH (ref 3.8–10.5)

## 2021-09-14 PROCEDURE — 71045 X-RAY EXAM CHEST 1 VIEW: CPT | Mod: 26

## 2021-09-14 PROCEDURE — 85027 COMPLETE CBC AUTOMATED: CPT

## 2021-09-14 PROCEDURE — 99284 EMERGENCY DEPT VISIT MOD MDM: CPT | Mod: 25

## 2021-09-14 PROCEDURE — 99284 EMERGENCY DEPT VISIT MOD MDM: CPT

## 2021-09-14 PROCEDURE — 36415 COLL VENOUS BLD VENIPUNCTURE: CPT

## 2021-09-14 PROCEDURE — 71045 X-RAY EXAM CHEST 1 VIEW: CPT

## 2021-09-14 PROCEDURE — 80053 COMPREHEN METABOLIC PANEL: CPT

## 2021-09-14 PROCEDURE — 96374 THER/PROPH/DIAG INJ IV PUSH: CPT

## 2021-09-14 PROCEDURE — 94640 AIRWAY INHALATION TREATMENT: CPT

## 2021-09-14 PROCEDURE — 96375 TX/PRO/DX INJ NEW DRUG ADDON: CPT

## 2021-09-14 RX ORDER — IPRATROPIUM/ALBUTEROL SULFATE 18-103MCG
3 AEROSOL WITH ADAPTER (GRAM) INHALATION ONCE
Refills: 0 | Status: COMPLETED | OUTPATIENT
Start: 2021-09-14 | End: 2021-09-14

## 2021-09-14 RX ORDER — MAGNESIUM SULFATE 500 MG/ML
2 VIAL (ML) INJECTION ONCE
Refills: 0 | Status: COMPLETED | OUTPATIENT
Start: 2021-09-14 | End: 2021-09-14

## 2021-09-14 RX ADMIN — Medication 3 MILLILITER(S): at 02:20

## 2021-09-14 RX ADMIN — Medication 50 GRAM(S): at 02:18

## 2021-09-14 RX ADMIN — Medication 125 MILLIGRAM(S): at 02:18

## 2021-09-14 NOTE — ED ADULT TRIAGE NOTE - CHIEF COMPLAINT QUOTE
pt with difficulty breathing starting today this morning. pt with hx of COPD. pt received 1 combi treatment by EMS. pt unable to speak sentences with tripod position.

## 2021-09-14 NOTE — ED PROVIDER NOTE - NSFOLLOWUPINSTRUCTIONS_ED_ALL_ED_FT
- Follow up with your doctor within 2-3 days.   - Follow up with your Pulmonologist.   - Take Prednisone 40mg once a day for 4 more days.   - Use your nebulizer every 4-6 hours as needed for next 2 days.   - Return to the ED for any new or worsening symptoms.       Chronic Obstructive Pulmonary Disease    Chronic obstructive pulmonary disease (COPD) is a lung condition in which airflow from the lungs is limited. Causes include smoking, secondhand smoke exposure, genetics, or recurrent infections. Take all medicines (inhaled or pills) as directed by your health care provider. Avoid exposure to irritants such as smoke, chemicals, and fumes that aggravate your breathing.    If you are a smoker, the most important thing that you can do is stop smoking. Continuing to smoke will cause further lung damage and breathing trouble. Ask your health care provider for help with quitting smoking.    SEEK IMMEDIATE MEDICAL CARE IF YOU HAVE ANY OF THE FOLLOWING SYMPTOMS: shortness of breath at rest or when talking, bluish discoloration of lips, skin, fever, worsening cough, unexplained chest pain, or lightheadedness/dizziness.

## 2021-09-14 NOTE — ED PROVIDER NOTE - PHYSICAL EXAMINATION
Gen: Well appearing in NAD  Head: NC/AT  Neck: trachea midline  Resp: (+)Tachypneic, pt speaking 2-3 word sentences with diffuse expiratory wheezing  CV: RRR  GI: Abdomen soft, nontender, nondistended   Ext: no deformities. No lower extremity swelling. No calf TTP.   Neuro:  A&O appears non focal  Skin:  Warm and dry as visualized  Psych:  Normal affect and mood Gen: Well appearing in NAD  Head: NC/AT  Neck: trachea midline  Resp: (+)Tachypneic, pt speaking 2-3 word sentences. Diffuse diminished breath sounds with diffuse expiratory wheezing.   CV: RRR  GI: Abdomen soft, nontender, nondistended   Ext: no deformities. No lower extremity swelling. No calf TTP.   Neuro:  A&O appears non focal  Skin:  Warm and dry as visualized  Psych:  Normal affect and mood

## 2021-09-14 NOTE — ED PROVIDER NOTE - OBJECTIVE STATEMENT
61 y/o female with a PMHx of COPD, presents to the ED c/o SOB that began yesterday morning. Reports symptoms similar to COPD exacerbation. Denies fever, n/v, chest pain, cough, leg pain, recent travel, recent illness, or known sick contacts at home. Pt states she quit smoking 1 month ago. 59 y/o female with a PMHx of COPD, presents to the ED c/o SOB that began yesterday morning. Denies relief with nebulizer machine at home. Pt received 1 combi treatment by EMS. Denies fever, n/v, chest pain, cough, leg pain, recent travel, recent illness, or known sick contacts at home. Pt states she quit smoking 1 month ago. 59 y/o female with a PMHx of COPD, presents to the ED c/o SOB that began yesterday morning. Denies relief with nebulizer machine at home. Pt received 1 combi treatment by EMS. Denies fever, n/v, chest pain, cough, leg pain, recent travel, recent illness, or known sick contacts at home. Pt states she quit smoking 1 month ago. Is vaccinated for covid.

## 2021-09-14 NOTE — ED PROVIDER NOTE - PATIENT PORTAL LINK FT
You can access the FollowMyHealth Patient Portal offered by [facility] by registering at the following website: [email or web address]. By joining [employer]’s FollowMyHealth portal, you will also be able to view your health information using other applications (apps) compatible with our system.

## 2021-09-14 NOTE — ED PROVIDER NOTE - PROGRESS NOTE DETAILS
Zohra HFOFMAN: patient feeling significantly better, speaking in full sentences. Ambulating in ED. Saturating 97% on RA. Faint end exp wheeze on exam. Ready for dc, has follow up with pulmonology, has oxygen at home. Return precautions given.

## 2021-09-14 NOTE — ED ADULT NURSE NOTE - OBJECTIVE STATEMENT
assumed care in critical care bay. pt endorsing in SOB since this morning. pt sating 83% on RA. placed on ventri mask. pt has a hx of copd.  pt reports taking albuterol treatments all day which did not relive SOB. pt in tripod position. rr labored and even. increase work of breathing and abdominal breathing noted. MD bollag at bedside.  no wheezing noted. EKG obtained. medications administered as per EMR. placed on cardiac monitor. denies chest pain. pt educated on plan of care, pt able to successfully teach back plan of care to RN, RN will continue to reeducate pt during hospital stay.

## 2021-09-24 ENCOUNTER — INPATIENT (INPATIENT)
Facility: HOSPITAL | Age: 61
LOS: 3 days | Discharge: HOME | DRG: 208 | End: 2021-09-28
Attending: FAMILY MEDICINE | Admitting: INTERNAL MEDICINE
Payer: MEDICARE

## 2021-09-24 VITALS
HEIGHT: 65 IN | SYSTOLIC BLOOD PRESSURE: 116 MMHG | DIASTOLIC BLOOD PRESSURE: 72 MMHG | HEART RATE: 150 BPM | WEIGHT: 132.28 LBS

## 2021-09-24 DIAGNOSIS — Z98.89 OTHER SPECIFIED POSTPROCEDURAL STATES: Chronic | ICD-10-CM

## 2021-09-24 DIAGNOSIS — Z90.89 ACQUIRED ABSENCE OF OTHER ORGANS: Chronic | ICD-10-CM

## 2021-09-24 DIAGNOSIS — Q76.1 KLIPPEL-FEIL SYNDROME: Chronic | ICD-10-CM

## 2021-09-24 DIAGNOSIS — J96.01 ACUTE RESPIRATORY FAILURE WITH HYPOXIA: ICD-10-CM

## 2021-09-24 LAB
ALBUMIN SERPL ELPH-MCNC: 4.7 G/DL — SIGNIFICANT CHANGE UP (ref 3.3–5.2)
ALP SERPL-CCNC: 90 U/L — SIGNIFICANT CHANGE UP (ref 40–120)
ALT FLD-CCNC: 14 U/L — SIGNIFICANT CHANGE UP
ANION GAP SERPL CALC-SCNC: 11 MMOL/L — SIGNIFICANT CHANGE UP (ref 5–17)
APTT BLD: 34.2 SEC — SIGNIFICANT CHANGE UP (ref 27.5–35.5)
AST SERPL-CCNC: 25 U/L — SIGNIFICANT CHANGE UP
BASE EXCESS BLDV CALC-SCNC: 5 MMOL/L — HIGH (ref -2–3)
BASOPHILS # BLD AUTO: 0.12 K/UL — SIGNIFICANT CHANGE UP (ref 0–0.2)
BASOPHILS NFR BLD AUTO: 0.9 % — SIGNIFICANT CHANGE UP (ref 0–2)
BILIRUB SERPL-MCNC: 0.3 MG/DL — LOW (ref 0.4–2)
BLD GP AB SCN SERPL QL: SIGNIFICANT CHANGE UP
BUN SERPL-MCNC: 10.2 MG/DL — SIGNIFICANT CHANGE UP (ref 8–20)
CA-I SERPL-SCNC: 1.22 MMOL/L — SIGNIFICANT CHANGE UP (ref 1.15–1.33)
CALCIUM SERPL-MCNC: 9.4 MG/DL — SIGNIFICANT CHANGE UP (ref 8.6–10.2)
CHLORIDE BLDV-SCNC: 105 MMOL/L — SIGNIFICANT CHANGE UP (ref 98–107)
CHLORIDE SERPL-SCNC: 100 MMOL/L — SIGNIFICANT CHANGE UP (ref 98–107)
CO2 SERPL-SCNC: 29 MMOL/L — SIGNIFICANT CHANGE UP (ref 22–29)
CREAT SERPL-MCNC: 0.55 MG/DL — SIGNIFICANT CHANGE UP (ref 0.5–1.3)
EOSINOPHIL # BLD AUTO: 0.91 K/UL — HIGH (ref 0–0.5)
EOSINOPHIL NFR BLD AUTO: 6.8 % — HIGH (ref 0–6)
GAS PNL BLDA: SIGNIFICANT CHANGE UP
GAS PNL BLDA: SIGNIFICANT CHANGE UP
GAS PNL BLDV: 138 MMOL/L — SIGNIFICANT CHANGE UP (ref 136–145)
GAS PNL BLDV: SIGNIFICANT CHANGE UP
GLUCOSE BLDV-MCNC: 173 MG/DL — HIGH (ref 70–99)
GLUCOSE SERPL-MCNC: 169 MG/DL — HIGH (ref 70–99)
HCG SERPL-ACNC: 4 MIU/ML — SIGNIFICANT CHANGE UP
HCO3 BLDV-SCNC: 34 MMOL/L — HIGH (ref 22–29)
HCT VFR BLD CALC: 43.5 % — SIGNIFICANT CHANGE UP (ref 34.5–45)
HGB BLD-MCNC: 13.4 G/DL — SIGNIFICANT CHANGE UP (ref 11.5–15.5)
IMM GRANULOCYTES NFR BLD AUTO: 0.5 % — SIGNIFICANT CHANGE UP (ref 0–1.5)
INR BLD: 1.02 RATIO — SIGNIFICANT CHANGE UP (ref 0.88–1.16)
LACTATE BLDV-MCNC: 0.7 MMOL/L — SIGNIFICANT CHANGE UP (ref 0.5–2)
LYMPHOCYTES # BLD AUTO: 16.7 % — SIGNIFICANT CHANGE UP (ref 13–44)
LYMPHOCYTES # BLD AUTO: 2.23 K/UL — SIGNIFICANT CHANGE UP (ref 1–3.3)
MAGNESIUM SERPL-MCNC: 2.1 MG/DL — SIGNIFICANT CHANGE UP (ref 1.8–2.6)
MCHC RBC-ENTMCNC: 30.8 GM/DL — LOW (ref 32–36)
MCHC RBC-ENTMCNC: 31.3 PG — SIGNIFICANT CHANGE UP (ref 27–34)
MCV RBC AUTO: 101.6 FL — HIGH (ref 80–100)
MONOCYTES # BLD AUTO: 0.87 K/UL — SIGNIFICANT CHANGE UP (ref 0–0.9)
MONOCYTES NFR BLD AUTO: 6.5 % — SIGNIFICANT CHANGE UP (ref 2–14)
NEUTROPHILS # BLD AUTO: 9.12 K/UL — HIGH (ref 1.8–7.4)
NEUTROPHILS NFR BLD AUTO: 68.6 % — SIGNIFICANT CHANGE UP (ref 43–77)
NT-PROBNP SERPL-SCNC: 198 PG/ML — SIGNIFICANT CHANGE UP (ref 0–300)
PCO2 BLDV: 100 MMHG — HIGH (ref 39–42)
PH BLDV: 7.14 — CRITICAL LOW (ref 7.32–7.43)
PLATELET # BLD AUTO: 279 K/UL — SIGNIFICANT CHANGE UP (ref 150–400)
PO2 BLDV: 219 MMHG — HIGH (ref 25–45)
POTASSIUM BLDV-SCNC: 4.6 MMOL/L — SIGNIFICANT CHANGE UP (ref 3.5–5.1)
POTASSIUM SERPL-MCNC: 5.1 MMOL/L — SIGNIFICANT CHANGE UP (ref 3.5–5.3)
POTASSIUM SERPL-SCNC: 5.1 MMOL/L — SIGNIFICANT CHANGE UP (ref 3.5–5.3)
PROT SERPL-MCNC: 7.9 G/DL — SIGNIFICANT CHANGE UP (ref 6.6–8.7)
PROTHROM AB SERPL-ACNC: 11.8 SEC — SIGNIFICANT CHANGE UP (ref 10.6–13.6)
RAPID RVP RESULT: SIGNIFICANT CHANGE UP
RBC # BLD: 4.28 M/UL — SIGNIFICANT CHANGE UP (ref 3.8–5.2)
RBC # FLD: 13.3 % — SIGNIFICANT CHANGE UP (ref 10.3–14.5)
SAO2 % BLDV: 99.6 % — SIGNIFICANT CHANGE UP
SARS-COV-2 RNA SPEC QL NAA+PROBE: SIGNIFICANT CHANGE UP
SODIUM SERPL-SCNC: 140 MMOL/L — SIGNIFICANT CHANGE UP (ref 135–145)
TROPONIN T SERPL-MCNC: <0.01 NG/ML — SIGNIFICANT CHANGE UP (ref 0–0.06)
WBC # BLD: 13.32 K/UL — HIGH (ref 3.8–10.5)
WBC # FLD AUTO: 13.32 K/UL — HIGH (ref 3.8–10.5)

## 2021-09-24 PROCEDURE — 99291 CRITICAL CARE FIRST HOUR: CPT | Mod: CS,GC

## 2021-09-24 PROCEDURE — 71275 CT ANGIOGRAPHY CHEST: CPT | Mod: 26,ME

## 2021-09-24 PROCEDURE — G1004: CPT

## 2021-09-24 PROCEDURE — 71045 X-RAY EXAM CHEST 1 VIEW: CPT | Mod: 26

## 2021-09-24 PROCEDURE — 70450 CT HEAD/BRAIN W/O DYE: CPT | Mod: 26,ME

## 2021-09-24 RX ORDER — SODIUM CHLORIDE 9 MG/ML
1000 INJECTION INTRAMUSCULAR; INTRAVENOUS; SUBCUTANEOUS ONCE
Refills: 0 | Status: COMPLETED | OUTPATIENT
Start: 2021-09-24 | End: 2021-09-24

## 2021-09-24 RX ORDER — PROPOFOL 10 MG/ML
10 INJECTION, EMULSION INTRAVENOUS
Qty: 1000 | Refills: 0 | Status: DISCONTINUED | OUTPATIENT
Start: 2021-09-24 | End: 2021-09-25

## 2021-09-24 RX ORDER — CHLORHEXIDINE GLUCONATE 213 G/1000ML
15 SOLUTION TOPICAL EVERY 12 HOURS
Refills: 0 | Status: DISCONTINUED | OUTPATIENT
Start: 2021-09-24 | End: 2021-09-25

## 2021-09-24 RX ORDER — ETOMIDATE 2 MG/ML
20 INJECTION INTRAVENOUS ONCE
Refills: 0 | Status: COMPLETED | OUTPATIENT
Start: 2021-09-24 | End: 2021-09-24

## 2021-09-24 RX ORDER — FENTANYL CITRATE 50 UG/ML
50 INJECTION INTRAVENOUS ONCE
Refills: 0 | Status: DISCONTINUED | OUTPATIENT
Start: 2021-09-24 | End: 2021-09-24

## 2021-09-24 RX ORDER — ROCURONIUM BROMIDE 10 MG/ML
100 VIAL (ML) INTRAVENOUS ONCE
Refills: 0 | Status: COMPLETED | OUTPATIENT
Start: 2021-09-24 | End: 2021-09-24

## 2021-09-24 RX ORDER — DILTIAZEM HCL 120 MG
10 CAPSULE, EXT RELEASE 24 HR ORAL ONCE
Refills: 0 | Status: COMPLETED | OUTPATIENT
Start: 2021-09-24 | End: 2021-09-24

## 2021-09-24 RX ORDER — CHLORHEXIDINE GLUCONATE 213 G/1000ML
1 SOLUTION TOPICAL
Refills: 0 | Status: DISCONTINUED | OUTPATIENT
Start: 2021-09-24 | End: 2021-09-28

## 2021-09-24 RX ORDER — FENTANYL CITRATE 50 UG/ML
0.5 INJECTION INTRAVENOUS
Qty: 2500 | Refills: 0 | Status: DISCONTINUED | OUTPATIENT
Start: 2021-09-24 | End: 2021-09-25

## 2021-09-24 RX ORDER — IPRATROPIUM/ALBUTEROL SULFATE 18-103MCG
3 AEROSOL WITH ADAPTER (GRAM) INHALATION
Refills: 0 | Status: DISCONTINUED | OUTPATIENT
Start: 2021-09-24 | End: 2021-09-25

## 2021-09-24 RX ORDER — MAGNESIUM SULFATE 500 MG/ML
2 VIAL (ML) INJECTION ONCE
Refills: 0 | Status: COMPLETED | OUTPATIENT
Start: 2021-09-24 | End: 2021-09-24

## 2021-09-24 RX ORDER — EPINEPHRINE 0.3 MG/.3ML
0.3 INJECTION INTRAMUSCULAR; SUBCUTANEOUS ONCE
Refills: 0 | Status: COMPLETED | OUTPATIENT
Start: 2021-09-24 | End: 2021-09-24

## 2021-09-24 RX ORDER — FENTANYL CITRATE 50 UG/ML
0.5 INJECTION INTRAVENOUS
Qty: 2500 | Refills: 0 | Status: DISCONTINUED | OUTPATIENT
Start: 2021-09-24 | End: 2021-09-24

## 2021-09-24 RX ADMIN — FENTANYL CITRATE 2.5 MICROGRAM(S)/KG/HR: 50 INJECTION INTRAVENOUS at 20:14

## 2021-09-24 RX ADMIN — SODIUM CHLORIDE 1000 MILLILITER(S): 9 INJECTION INTRAMUSCULAR; INTRAVENOUS; SUBCUTANEOUS at 18:30

## 2021-09-24 RX ADMIN — Medication 150 GRAM(S): at 18:30

## 2021-09-24 RX ADMIN — PROPOFOL 3 MICROGRAM(S)/KG/MIN: 10 INJECTION, EMULSION INTRAVENOUS at 22:31

## 2021-09-24 RX ADMIN — FENTANYL CITRATE 50 MICROGRAM(S): 50 INJECTION INTRAVENOUS at 19:50

## 2021-09-24 RX ADMIN — EPINEPHRINE 0.3 MILLIGRAM(S): 0.3 INJECTION INTRAMUSCULAR; SUBCUTANEOUS at 18:10

## 2021-09-24 RX ADMIN — Medication 10 MILLIGRAM(S): at 18:50

## 2021-09-24 RX ADMIN — Medication 10 MILLIGRAM(S): at 18:40

## 2021-09-24 RX ADMIN — FENTANYL CITRATE 50 MICROGRAM(S): 50 INJECTION INTRAVENOUS at 20:00

## 2021-09-24 RX ADMIN — Medication 100 MILLIGRAM(S): at 18:14

## 2021-09-24 RX ADMIN — ETOMIDATE 20 MILLIGRAM(S): 2 INJECTION INTRAVENOUS at 18:14

## 2021-09-24 RX ADMIN — PROPOFOL 3 MICROGRAM(S)/KG/MIN: 10 INJECTION, EMULSION INTRAVENOUS at 19:10

## 2021-09-24 NOTE — H&P ADULT - NSHPPHYSICALEXAM_GEN_ALL_CORE
GENERAL: Adult female, lying in bed, NAD  HEENT: NC/AT, ett and OGT in place   CV: NSR  RESP: symmetrical thorax expansion upon respiration, +wheezing b/l   ABD: soft, nontender, nondistended, normoactive bs, no masses appreciated  : horowitz in place   EXT: no edema  SKIN: warm, no rashes appreciated  NEURO: Sedated GENERAL: Adult female, lying in bed, NAD  HEENT: NC/AT, right pupil irregular (baseline), ett and OGT in place   CV: NSR  RESP: symmetrical thorax expansion upon respiration, +wheezing b/l   ABD: soft, nontender, nondistended, normoactive bs, no masses appreciated  : horowitz in place   EXT: no edema  SKIN: warm, no rashes appreciated  NEURO: Sedated

## 2021-09-24 NOTE — ED PROVIDER NOTE - NSICDXPASTMEDICALHX_GEN_ALL_CORE_FT
PAST MEDICAL HISTORY:  COPD (chronic obstructive pulmonary disease)     Neuropathy     Rheumatoid arthritis, involving unspecified site, unspecified whether rheumatoid factor present     Tumors Spinal tumor

## 2021-09-24 NOTE — ED PROVIDER NOTE - CARE PLAN
1 Principal Discharge DX:	Acute respiratory failure with hypoxia and hypercapnia  Secondary Diagnosis:	COPD exacerbation  Secondary Diagnosis:	Seizure-like activity

## 2021-09-24 NOTE — ED ADULT TRIAGE NOTE - CHIEF COMPLAINT QUOTE
patient called for respiratory distress, hypoxic 70s upon EMS arrival followed by witnessed seizure. medicated with 125mg Solumedrol and 2.5 mg IV versed. patient arrives agonally breathing and minimally responsive. intubated for airway protection

## 2021-09-24 NOTE — ED PROVIDER NOTE - PHYSICAL EXAMINATION
Constitutional: Pt lethargic and obtunded, in severe respiratory distress  Eyes: no scleral icterus, PERRL  HENT: normocephalic, atraumatic, moist oral mucosa  Neck: supple  CV: RRR, no murmur  Pulm: +diffuse wheezing and markedly diminished air movement  Abdomen: soft, non-tender, non-distended  Extremities: no edema, no deformity  Skin: no rash, no jaundice  Neuro: moving all extremities equally

## 2021-09-24 NOTE — ED ADULT NURSE NOTE - OBJECTIVE STATEMENT
respiratory distress.  Per EMS, pt was found hypoxic to 70s at home.  Placed on NRB and given solu-medrol 125 mg IV.  En route to ED, pt had witnessed episode of seizure-like activity.  Given versed 2.5 mg IV with cessation of seizure activity.  Pt obtunded on arrival in the ED

## 2021-09-24 NOTE — ED PROVIDER NOTE - PROGRESS NOTE DETAILS
Given the significant and immediate threats to this patient based on initial presentation, the benefits of emergency contrast-enhanced CT imaging without obtaining GFR/creatinine serum level results greatly outweigh the potential risk of harm due to contrast-induced nephropathy. Uche: Consult placed to MICU. Ivey: Pt waking up despite propofol.  Started on fentanyl drip.  Additional hx obtained from pt's girlfriend, who states that was just evaluated in this ED 10 days ago for COPD exacerbation.  Finished course of prednisone, but then developed worsening shortness of breath.  Symptoms worsened today despite using albuterol.  Pt has been unable to f/u with pulmonologist as outpatient.  Pt does not normally use home O2.  Does not have CPAP machine. CTA chest neg for PE.  Case d/w MICU Dr. Knight and pt accepted

## 2021-09-24 NOTE — ED PROVIDER NOTE - ADDITIONAL RISK FACTOR FREE TEXT BOX
I have personally provided __54_ minutes of critical care time exclusive of time spent on separately billable procedures. Time includes review of laboratory data, radiology results, discussion with consultants, and monitoring for potential decompensation. Interventions were performed as documented above

## 2021-09-24 NOTE — H&P ADULT - ASSESSMENT
61 yo f pmhx chronic hypoxic respiratory failure on 3L NC, RA on MTX, chronic back pain, neuropathy, active smoker, recent admission for COPD exacerbation (discharged 9/14) on steroid taper admitted with     1. Hypoxic/Hypercapnic Respiratory Failure  2. COPD Exacerbation     NEURO: Propofol and fentanyl for sedation.    CV: No active issues.    RESP: Hypoxic/Hypercapnic respiratory failure, ac/vc 6cc/kg tv lung protective strategies, actively titrating fio2/peep for spo2 >88%, wean as tolerated; nebs/inh q6hr for wheezing/sob; steroid therapy.    RENAL: Monitor lytes, replace as needed.    GI: NPO, OGT in place   ENDO: ISS for glycemic control   ID: COPD exacerbation, ceftriaxone for coverage.    HEME: Lovenox for VTE ppx.   DISPO: Full code.  Patient's significant other Bhavna Frankenoc [phone] updated at bedside.      Critical Care time: 45 mins assessing presenting problems of acute illness that poses high probability of life threatening deterioration or end organ damage/dysfunction.  Medical decision making including Initiating plan of care, reviewing data, reviewing radiology, discussing with multidisciplinary team, non inclusive of procedures, discussing goals of care with patient/family

## 2021-09-24 NOTE — ED PROVIDER NOTE - ATTENDING CONTRIBUTION TO CARE
Documentation, procedures, interpretation of results, consultation with other physicians.    I personally saw the patient with the resident, and completed the key components of the history and physical exam. I then discussed the management plan with the resident.    60 y/o F with COPD BIBEMS for respiratory distress, seizure-like activity, required intubation for respiratory distress and minimal mental status, Priority CT head and PE protocol, which were both negative for acute pathology - patient admitted to MICU.

## 2021-09-24 NOTE — H&P ADULT - NSICDXPASTMEDICALHX_GEN_ALL_CORE_FT
PAST MEDICAL HISTORY:  COPD (chronic obstructive pulmonary disease)     Neuropathy     Rheumatoid arthritis, involving unspecified site, unspecified whether rheumatoid factor present     Tumors Spinal tumor-benign

## 2021-09-24 NOTE — H&P ADULT - NSHPSOCIALHISTORY_GEN_ALL_CORE
From home, lives with long term girlfriend, active smoker -Bhavna endorses she quit ~2 weeks ago after previous hospitalization.

## 2021-09-24 NOTE — ED PROVIDER NOTE - CLINICAL SUMMARY MEDICAL DECISION MAKING FREE TEXT BOX
61y F w/ hx COPD, presenting for respiratory distress.  Had witnessed seizure-like activity in the field.  Given solu-medrol prior to arrival.  Pt obtunded with poor air movement on arrival, satting ~91% on NRB.  Pt given IM epi and intubated.  Thereafter was noted to be tachycardic to 150s -- EKG showing possible SVT.  Given cardizem 10 mg IV x 2 with improvement to 120s.  Rpt EKG showing ST with RBBB and LAFB.  Will give additional nebs.  Check CT head and CTA chest.  Admit to MICU.

## 2021-09-24 NOTE — H&P ADULT - HISTORY OF PRESENT ILLNESS
59 yo f pmhx RA on MTX, chronic back pain, neuropathy, active smoker, recent admission for COPD exacerbation (discharged 9/14)  59 yo f pmhx RA on MTX, chronic back pain, neuropathy, active smoker, recent admission for COPD exacerbation (discharged 9/14) on steroid taper biba from home with respiratory distress.  Per patient's significant other Bhavna Ordonez, patient's sob began yesterday 59 yo f pmhx chronic hypoxic respiratory failure on 3L NC, RA on MTX, chronic back pain, neuropathy, active smoker, recent admission for COPD exacerbation (discharged 9/14) on steroid taper biba from home with respiratory distress.  Per patient's significant other Bhavna Ordonez, patient's sob began yesterday and had progressed since. Today patient's home pulse ox had been maintaining low 80s; she took several neb treatments without improvement prompting 911 call.  Upon EMS arrival patient hypoxic tot he 70s, placed on NRB with improvement to the high 80s.  En route with EMS patient with seizure like activity, given Versed 2.5mg IVP with cessation of seizure activity however upon arrival to ED patient obtunded and was subsequently intubated.

## 2021-09-25 DIAGNOSIS — J96.02 ACUTE RESPIRATORY FAILURE WITH HYPERCAPNIA: ICD-10-CM

## 2021-09-25 DIAGNOSIS — R56.9 UNSPECIFIED CONVULSIONS: ICD-10-CM

## 2021-09-25 DIAGNOSIS — J44.1 CHRONIC OBSTRUCTIVE PULMONARY DISEASE WITH (ACUTE) EXACERBATION: ICD-10-CM

## 2021-09-25 DIAGNOSIS — G62.9 POLYNEUROPATHY, UNSPECIFIED: ICD-10-CM

## 2021-09-25 DIAGNOSIS — M06.9 RHEUMATOID ARTHRITIS, UNSPECIFIED: ICD-10-CM

## 2021-09-25 LAB
A1C WITH ESTIMATED AVERAGE GLUCOSE RESULT: 5.6 % — SIGNIFICANT CHANGE UP (ref 4–5.6)
ALBUMIN SERPL ELPH-MCNC: 4 G/DL — SIGNIFICANT CHANGE UP (ref 3.3–5.2)
ALP SERPL-CCNC: 78 U/L — SIGNIFICANT CHANGE UP (ref 40–120)
ALT FLD-CCNC: 13 U/L — SIGNIFICANT CHANGE UP
ANION GAP SERPL CALC-SCNC: 15 MMOL/L — SIGNIFICANT CHANGE UP (ref 5–17)
AST SERPL-CCNC: 18 U/L — SIGNIFICANT CHANGE UP
BILIRUB SERPL-MCNC: 0.4 MG/DL — SIGNIFICANT CHANGE UP (ref 0.4–2)
BUN SERPL-MCNC: 12.1 MG/DL — SIGNIFICANT CHANGE UP (ref 8–20)
CALCIUM SERPL-MCNC: 8.8 MG/DL — SIGNIFICANT CHANGE UP (ref 8.6–10.2)
CHLORIDE SERPL-SCNC: 99 MMOL/L — SIGNIFICANT CHANGE UP (ref 98–107)
CO2 SERPL-SCNC: 23 MMOL/L — SIGNIFICANT CHANGE UP (ref 22–29)
COVID-19 SPIKE DOMAIN AB INTERP: POSITIVE
COVID-19 SPIKE DOMAIN ANTIBODY RESULT: 58.3 U/ML — HIGH
CREAT SERPL-MCNC: 0.53 MG/DL — SIGNIFICANT CHANGE UP (ref 0.5–1.3)
ESTIMATED AVERAGE GLUCOSE: 114 MG/DL — SIGNIFICANT CHANGE UP (ref 68–114)
GAS PNL BLDA: SIGNIFICANT CHANGE UP
GAS PNL BLDA: SIGNIFICANT CHANGE UP
GLUCOSE BLDC GLUCOMTR-MCNC: 116 MG/DL — HIGH (ref 70–99)
GLUCOSE BLDC GLUCOMTR-MCNC: 150 MG/DL — HIGH (ref 70–99)
GLUCOSE BLDC GLUCOMTR-MCNC: 150 MG/DL — HIGH (ref 70–99)
GLUCOSE BLDC GLUCOMTR-MCNC: 174 MG/DL — HIGH (ref 70–99)
GLUCOSE SERPL-MCNC: 150 MG/DL — HIGH (ref 70–99)
HCT VFR BLD CALC: 37 % — SIGNIFICANT CHANGE UP (ref 34.5–45)
HGB BLD-MCNC: 11.8 G/DL — SIGNIFICANT CHANGE UP (ref 11.5–15.5)
LACTATE SERPL-SCNC: 4.2 MMOL/L — CRITICAL HIGH (ref 0.5–2)
MAGNESIUM SERPL-MCNC: 2 MG/DL — SIGNIFICANT CHANGE UP (ref 1.6–2.6)
MCHC RBC-ENTMCNC: 31.1 PG — SIGNIFICANT CHANGE UP (ref 27–34)
MCHC RBC-ENTMCNC: 31.9 GM/DL — LOW (ref 32–36)
MCV RBC AUTO: 97.4 FL — SIGNIFICANT CHANGE UP (ref 80–100)
PHOSPHATE SERPL-MCNC: 2.2 MG/DL — LOW (ref 2.4–4.7)
PLATELET # BLD AUTO: 245 K/UL — SIGNIFICANT CHANGE UP (ref 150–400)
POTASSIUM SERPL-MCNC: 4.2 MMOL/L — SIGNIFICANT CHANGE UP (ref 3.5–5.3)
POTASSIUM SERPL-SCNC: 4.2 MMOL/L — SIGNIFICANT CHANGE UP (ref 3.5–5.3)
PROT SERPL-MCNC: 6.6 G/DL — SIGNIFICANT CHANGE UP (ref 6.6–8.7)
RBC # BLD: 3.8 M/UL — SIGNIFICANT CHANGE UP (ref 3.8–5.2)
RBC # FLD: 13.4 % — SIGNIFICANT CHANGE UP (ref 10.3–14.5)
SARS-COV-2 IGG+IGM SERPL QL IA: 58.3 U/ML — HIGH
SARS-COV-2 IGG+IGM SERPL QL IA: POSITIVE
SODIUM SERPL-SCNC: 137 MMOL/L — SIGNIFICANT CHANGE UP (ref 135–145)
WBC # BLD: 12.05 K/UL — HIGH (ref 3.8–10.5)
WBC # FLD AUTO: 12.05 K/UL — HIGH (ref 3.8–10.5)

## 2021-09-25 PROCEDURE — 99233 SBSQ HOSP IP/OBS HIGH 50: CPT

## 2021-09-25 RX ORDER — SODIUM,POTASSIUM PHOSPHATES 278-250MG
1 POWDER IN PACKET (EA) ORAL ONCE
Refills: 0 | Status: COMPLETED | OUTPATIENT
Start: 2021-09-25 | End: 2021-09-25

## 2021-09-25 RX ORDER — CEFTRIAXONE 500 MG/1
1000 INJECTION, POWDER, FOR SOLUTION INTRAMUSCULAR; INTRAVENOUS EVERY 24 HOURS
Refills: 0 | Status: DISCONTINUED | OUTPATIENT
Start: 2021-09-25 | End: 2021-09-28

## 2021-09-25 RX ORDER — DEXTROSE 50 % IN WATER 50 %
25 SYRINGE (ML) INTRAVENOUS ONCE
Refills: 0 | Status: DISCONTINUED | OUTPATIENT
Start: 2021-09-25 | End: 2021-09-28

## 2021-09-25 RX ORDER — SODIUM CHLORIDE 9 MG/ML
1000 INJECTION, SOLUTION INTRAVENOUS
Refills: 0 | Status: DISCONTINUED | OUTPATIENT
Start: 2021-09-25 | End: 2021-09-28

## 2021-09-25 RX ORDER — PANTOPRAZOLE SODIUM 20 MG/1
40 TABLET, DELAYED RELEASE ORAL DAILY
Refills: 0 | Status: DISCONTINUED | OUTPATIENT
Start: 2021-09-25 | End: 2021-09-28

## 2021-09-25 RX ORDER — AZITHROMYCIN 500 MG/1
500 TABLET, FILM COATED ORAL EVERY 24 HOURS
Refills: 0 | Status: DISCONTINUED | OUTPATIENT
Start: 2021-09-26 | End: 2021-09-28

## 2021-09-25 RX ORDER — SODIUM CHLORIDE 9 MG/ML
1000 INJECTION, SOLUTION INTRAVENOUS ONCE
Refills: 0 | Status: COMPLETED | OUTPATIENT
Start: 2021-09-25 | End: 2021-09-25

## 2021-09-25 RX ORDER — AZITHROMYCIN 500 MG/1
500 TABLET, FILM COATED ORAL ONCE
Refills: 0 | Status: COMPLETED | OUTPATIENT
Start: 2021-09-25 | End: 2021-09-25

## 2021-09-25 RX ORDER — AZITHROMYCIN 500 MG/1
TABLET, FILM COATED ORAL
Refills: 0 | Status: DISCONTINUED | OUTPATIENT
Start: 2021-09-25 | End: 2021-09-28

## 2021-09-25 RX ORDER — GLUCAGON INJECTION, SOLUTION 0.5 MG/.1ML
1 INJECTION, SOLUTION SUBCUTANEOUS ONCE
Refills: 0 | Status: DISCONTINUED | OUTPATIENT
Start: 2021-09-25 | End: 2021-09-28

## 2021-09-25 RX ORDER — FOLIC ACID 0.8 MG
1 TABLET ORAL DAILY
Refills: 0 | Status: DISCONTINUED | OUTPATIENT
Start: 2021-09-25 | End: 2021-09-28

## 2021-09-25 RX ORDER — DEXTROSE 50 % IN WATER 50 %
15 SYRINGE (ML) INTRAVENOUS ONCE
Refills: 0 | Status: DISCONTINUED | OUTPATIENT
Start: 2021-09-25 | End: 2021-09-28

## 2021-09-25 RX ORDER — ALBUTEROL 90 UG/1
2 AEROSOL, METERED ORAL EVERY 6 HOURS
Refills: 0 | Status: DISCONTINUED | OUTPATIENT
Start: 2021-09-25 | End: 2021-09-28

## 2021-09-25 RX ORDER — DEXTROSE 50 % IN WATER 50 %
12.5 SYRINGE (ML) INTRAVENOUS ONCE
Refills: 0 | Status: DISCONTINUED | OUTPATIENT
Start: 2021-09-25 | End: 2021-09-28

## 2021-09-25 RX ORDER — BUDESONIDE, MICRONIZED 100 %
0.5 POWDER (GRAM) MISCELLANEOUS EVERY 12 HOURS
Refills: 0 | Status: DISCONTINUED | OUTPATIENT
Start: 2021-09-25 | End: 2021-09-28

## 2021-09-25 RX ORDER — ENOXAPARIN SODIUM 100 MG/ML
40 INJECTION SUBCUTANEOUS DAILY
Refills: 0 | Status: DISCONTINUED | OUTPATIENT
Start: 2021-09-25 | End: 2021-09-28

## 2021-09-25 RX ORDER — OXYCODONE AND ACETAMINOPHEN 5; 325 MG/1; MG/1
1 TABLET ORAL EVERY 6 HOURS
Refills: 0 | Status: DISCONTINUED | OUTPATIENT
Start: 2021-09-25 | End: 2021-09-28

## 2021-09-25 RX ORDER — GABAPENTIN 400 MG/1
600 CAPSULE ORAL THREE TIMES A DAY
Refills: 0 | Status: DISCONTINUED | OUTPATIENT
Start: 2021-09-25 | End: 2021-09-28

## 2021-09-25 RX ORDER — INSULIN LISPRO 100/ML
VIAL (ML) SUBCUTANEOUS EVERY 6 HOURS
Refills: 0 | Status: DISCONTINUED | OUTPATIENT
Start: 2021-09-25 | End: 2021-09-28

## 2021-09-25 RX ORDER — TIOTROPIUM BROMIDE 18 UG/1
1 CAPSULE ORAL; RESPIRATORY (INHALATION) DAILY
Refills: 0 | Status: DISCONTINUED | OUTPATIENT
Start: 2021-09-25 | End: 2021-09-28

## 2021-09-25 RX ADMIN — PROPOFOL 3 MICROGRAM(S)/KG/MIN: 10 INJECTION, EMULSION INTRAVENOUS at 05:26

## 2021-09-25 RX ADMIN — ENOXAPARIN SODIUM 40 MILLIGRAM(S): 100 INJECTION SUBCUTANEOUS at 11:34

## 2021-09-25 RX ADMIN — Medication 1 MILLIGRAM(S): at 11:34

## 2021-09-25 RX ADMIN — OXYCODONE AND ACETAMINOPHEN 1 TABLET(S): 5; 325 TABLET ORAL at 22:33

## 2021-09-25 RX ADMIN — OXYCODONE AND ACETAMINOPHEN 1 TABLET(S): 5; 325 TABLET ORAL at 23:41

## 2021-09-25 RX ADMIN — Medication 40 MILLIGRAM(S): at 10:00

## 2021-09-25 RX ADMIN — OXYCODONE AND ACETAMINOPHEN 1 TABLET(S): 5; 325 TABLET ORAL at 16:50

## 2021-09-25 RX ADMIN — GABAPENTIN 600 MILLIGRAM(S): 400 CAPSULE ORAL at 14:12

## 2021-09-25 RX ADMIN — OXYCODONE AND ACETAMINOPHEN 1 TABLET(S): 5; 325 TABLET ORAL at 10:00

## 2021-09-25 RX ADMIN — GABAPENTIN 600 MILLIGRAM(S): 400 CAPSULE ORAL at 05:32

## 2021-09-25 RX ADMIN — Medication 125 MILLIGRAM(S): at 03:05

## 2021-09-25 RX ADMIN — Medication 1 PACKET(S): at 11:33

## 2021-09-25 RX ADMIN — OXYCODONE AND ACETAMINOPHEN 1 TABLET(S): 5; 325 TABLET ORAL at 11:30

## 2021-09-25 RX ADMIN — CHLORHEXIDINE GLUCONATE 1 APPLICATION(S): 213 SOLUTION TOPICAL at 05:28

## 2021-09-25 RX ADMIN — Medication 1: at 11:33

## 2021-09-25 RX ADMIN — CHLORHEXIDINE GLUCONATE 15 MILLILITER(S): 213 SOLUTION TOPICAL at 05:27

## 2021-09-25 RX ADMIN — Medication 0.5 MILLIGRAM(S): at 21:12

## 2021-09-25 RX ADMIN — SODIUM CHLORIDE 1000 MILLILITER(S): 9 INJECTION, SOLUTION INTRAVENOUS at 03:04

## 2021-09-25 RX ADMIN — SODIUM CHLORIDE 75 MILLILITER(S): 9 INJECTION, SOLUTION INTRAVENOUS at 03:04

## 2021-09-25 RX ADMIN — PANTOPRAZOLE SODIUM 40 MILLIGRAM(S): 20 TABLET, DELAYED RELEASE ORAL at 18:12

## 2021-09-25 RX ADMIN — Medication 40 MILLIGRAM(S): at 18:12

## 2021-09-25 RX ADMIN — CEFTRIAXONE 100 MILLIGRAM(S): 500 INJECTION, POWDER, FOR SOLUTION INTRAMUSCULAR; INTRAVENOUS at 03:04

## 2021-09-25 RX ADMIN — AZITHROMYCIN 255 MILLIGRAM(S): 500 TABLET, FILM COATED ORAL at 10:00

## 2021-09-25 RX ADMIN — GABAPENTIN 600 MILLIGRAM(S): 400 CAPSULE ORAL at 22:30

## 2021-09-25 NOTE — PROGRESS NOTE ADULT - SUBJECTIVE AND OBJECTIVE BOX
Patient is a 61 year old female with PMH COPD on 2-3L NC  O2 at home, RA on MTX, chronic back pain, smoker, neuropathy. Recent admission for COPD exacerbation, discharged 9/14, finished steroid taper a few days ago. She presented for worsening dyspnea for 1 day that progressed to her partner calling 911 and she was found to have an O2sat in the 70s, increased to high 80s on NRB. EMS reported seizure like activity in route and she was given Versed 2.5mg IVP, which resolved shortly after. She was intubated secondary to her being obtunded on arrival to [place].     Today at the bedside, she reports no pain.     PAST MEDICAL & SURGICAL HISTORY:  Tumors  Spinal tumor-benign    Rheumatoid arthritis, involving unspecified site, unspecified whether rheumatoid factor present    Neuropathy    COPD (chronic obstructive pulmonary disease)    Cervical fusion syndrome  C2-C3    S/P cervical discectomy    S/P tonsillectomy    S/P eye surgery  Right Cataract secondary to trauma        Review of Systems:  Unable to obtain as patient is intubated    Medications:  azithromycin  IVPB 500 milliGRAM(s) IV Intermittent once  azithromycin  IVPB      cefTRIAXone   IVPB 1000 milliGRAM(s) IV Intermittent every 24 hours      ALBUTerol    90 MICROgram(s) HFA Inhaler 2 Puff(s) Inhalation every 6 hours  albuterol/ipratropium for Nebulization 3 milliLiter(s) Nebulizer every 20 minutes    fentaNYL   Infusion. 0.5 MICROgram(s)/kG/Hr IV Continuous <Continuous>  gabapentin 600 milliGRAM(s) Oral three times a day  oxycodone    5 mG/acetaminophen 325 mG 1 Tablet(s) Oral every 6 hours PRN  propofol Infusion 10 MICROgram(s)/kG/Min IV Continuous <Continuous>      enoxaparin Injectable 40 milliGRAM(s) SubCutaneous daily    dextrose 40% Gel 15 Gram(s) Oral once  dextrose 50% Injectable 25 Gram(s) IV Push once  dextrose 50% Injectable 12.5 Gram(s) IV Push once  dextrose 50% Injectable 25 Gram(s) IV Push once  glucagon  Injectable 1 milliGRAM(s) IntraMuscular once  insulin lispro (ADMELOG) corrective regimen sliding scale   SubCutaneous every 6 hours  methylPREDNISolone sodium succinate Injectable 40 milliGRAM(s) IV Push every 8 hours    dextrose 5%. 1000 milliLiter(s) IV Continuous <Continuous>  dextrose 5%. 1000 milliLiter(s) IV Continuous <Continuous>  folic acid 1 milliGRAM(s) Oral daily  lactated ringers. 1000 milliLiter(s) IV Continuous <Continuous>  potassium phosphate / sodium phosphate Powder (PHOS-NaK) 1 Packet(s) Oral once    chlorhexidine 0.12% Liquid 15 milliLiter(s) Oral Mucosa every 12 hours  chlorhexidine 4% Liquid 1 Application(s) Topical <User Schedule>    Mode: AC/ CMV (Assist Control/ Continuous Mandatory Ventilation)  RR (machine): 22  TV (machine): 450  FiO2: 30  PEEP: 5  ITime: 0.8  MAP: 12    ICU Vital Signs Last 24 Hrs  T(C): 37.1 (25 Sep 2021 07:25), Max: 37.5 (25 Sep 2021 04:00)  T(F): 98.7 (25 Sep 2021 07:25), Max: 99.5 (25 Sep 2021 04:00)  HR: 72 (25 Sep 2021 09:00) (67 - 150)  BP: 120/75 (25 Sep 2021 09:00) (86/55 - 141/77)  BP(mean): 88 (25 Sep 2021 09:00) (65 - 103)  ABP: --  ABP(mean): --  RR: 20 (25 Sep 2021 09:00) (12 - 27)  SpO2: 98% (25 Sep 2021 09:00) (98% - 100%)    ABG - ( 25 Sep 2021 05:44 )  pH, Arterial: 7.410 pH, Blood: x     /  pCO2: 42    /  pO2: 84    / HCO3: 27    / Base Excess: 2.0   /  SaO2: 99.3      LABS:                        11.8   12.05 )-----------( 245      ( 25 Sep 2021 05:09 )             37.0     09-25    137  |  99  |  12.1  ----------------------------<  150<H>  4.2   |  23.0  |  0.53    Ca    8.8      25 Sep 2021 05:09  Phos  2.2     09-25  Mg     2.0     09-25    TPro  6.6  /  Alb  4.0  /  TBili  0.4  /  DBili  x   /  AST  18  /  ALT  13  /  AlkPhos  78  09-25      CARDIAC MARKERS ( 24 Sep 2021 18:28 )  x     / <0.01 ng/mL / x     / x     / x          CAPILLARY BLOOD GLUCOSE      POCT Blood Glucose.: 150 mg/dL (25 Sep 2021 05:23)    PT/INR - ( 24 Sep 2021 18:28 )   PT: 11.8 sec;   INR: 1.02 ratio         PTT - ( 24 Sep 2021 18:28 )  PTT:34.2 sec    CULTURES:  Rapid RVP Result: NotDetec (09-24 @ 18:42)      Physical Examination:    General: No acute distress. Intubated.     HEENT: Pupils equal, reactive to light.  Symmetric.    PULM: Clear to auscultation bilaterally, no significant sputum production    CVS: Regular rate and rhythm, no murmurs, rubs, or gallops    ABD: Soft, nondistended, nontender, normoactive bowel sounds, no masses    EXT: No edema, nontender    SKIN: Warm and well perfused, no rashes noted.    RADIOLOGY: ***    CRITICAL CARE TIME SPENT: ***   Patient is a 61 year old female with PMH COPD on 2-3L NC  O2 at home, RA on MTX, chronic back pain, smoker, neuropathy. Recent admission for COPD exacerbation, discharged 9/14, finished steroid taper a few days ago. She presented for worsening dyspnea for 1 day that progressed to her partner calling 911 and she was found to have an O2sat in the 70s, increased to high 80s on NRB. EMS reported seizure like activity in route and she was given Versed 2.5mg IVP, which resolved shortly after. She was intubated secondary to her being obtunded on arrival to [place].     Today at the bedside, she reports no pain. She is alert and responsive.     PAST MEDICAL & SURGICAL HISTORY:  Tumors  Spinal tumor-benign    Rheumatoid arthritis, involving unspecified site, unspecified whether rheumatoid factor present    Neuropathy    COPD (chronic obstructive pulmonary disease)    Cervical fusion syndrome  C2-C3    S/P cervical discectomy    S/P tonsillectomy    S/P eye surgery  Right Cataract secondary to trauma        Review of Systems:  Unable to obtain as patient is intubated    Medications:  azithromycin  IVPB 500 milliGRAM(s) IV Intermittent once  azithromycin  IVPB      cefTRIAXone   IVPB 1000 milliGRAM(s) IV Intermittent every 24 hours      ALBUTerol    90 MICROgram(s) HFA Inhaler 2 Puff(s) Inhalation every 6 hours  albuterol/ipratropium for Nebulization 3 milliLiter(s) Nebulizer every 20 minutes    fentaNYL   Infusion. 0.5 MICROgram(s)/kG/Hr IV Continuous <Continuous>  gabapentin 600 milliGRAM(s) Oral three times a day  oxycodone    5 mG/acetaminophen 325 mG 1 Tablet(s) Oral every 6 hours PRN  propofol Infusion 10 MICROgram(s)/kG/Min IV Continuous <Continuous>      enoxaparin Injectable 40 milliGRAM(s) SubCutaneous daily    dextrose 40% Gel 15 Gram(s) Oral once  dextrose 50% Injectable 25 Gram(s) IV Push once  dextrose 50% Injectable 12.5 Gram(s) IV Push once  dextrose 50% Injectable 25 Gram(s) IV Push once  glucagon  Injectable 1 milliGRAM(s) IntraMuscular once  insulin lispro (ADMELOG) corrective regimen sliding scale   SubCutaneous every 6 hours  methylPREDNISolone sodium succinate Injectable 40 milliGRAM(s) IV Push every 8 hours    dextrose 5%. 1000 milliLiter(s) IV Continuous <Continuous>  dextrose 5%. 1000 milliLiter(s) IV Continuous <Continuous>  folic acid 1 milliGRAM(s) Oral daily  lactated ringers. 1000 milliLiter(s) IV Continuous <Continuous>  potassium phosphate / sodium phosphate Powder (PHOS-NaK) 1 Packet(s) Oral once    chlorhexidine 0.12% Liquid 15 milliLiter(s) Oral Mucosa every 12 hours  chlorhexidine 4% Liquid 1 Application(s) Topical <User Schedule>    Mode: AC/ CMV (Assist Control/ Continuous Mandatory Ventilation)  RR (machine): 22  TV (machine): 450  FiO2: 30  PEEP: 5  ITime: 0.8  MAP: 12    ICU Vital Signs Last 24 Hrs  T(C): 37.1 (25 Sep 2021 07:25), Max: 37.5 (25 Sep 2021 04:00)  T(F): 98.7 (25 Sep 2021 07:25), Max: 99.5 (25 Sep 2021 04:00)  HR: 72 (25 Sep 2021 09:00) (67 - 150)  BP: 120/75 (25 Sep 2021 09:00) (86/55 - 141/77)  BP(mean): 88 (25 Sep 2021 09:00) (65 - 103)  ABP: --  ABP(mean): --  RR: 20 (25 Sep 2021 09:00) (12 - 27)  SpO2: 98% (25 Sep 2021 09:00) (98% - 100%)    ABG - ( 25 Sep 2021 05:44 )  pH, Arterial: 7.410 pH, Blood: x     /  pCO2: 42    /  pO2: 84    / HCO3: 27    / Base Excess: 2.0   /  SaO2: 99.3      LABS:                        11.8   12.05 )-----------( 245      ( 25 Sep 2021 05:09 )             37.0     09-25    137  |  99  |  12.1  ----------------------------<  150<H>  4.2   |  23.0  |  0.53    Ca    8.8      25 Sep 2021 05:09  Phos  2.2     09-25  Mg     2.0     09-25    TPro  6.6  /  Alb  4.0  /  TBili  0.4  /  DBili  x   /  AST  18  /  ALT  13  /  AlkPhos  78  09-25      CARDIAC MARKERS ( 24 Sep 2021 18:28 )  x     / <0.01 ng/mL / x     / x     / x          CAPILLARY BLOOD GLUCOSE      POCT Blood Glucose.: 150 mg/dL (25 Sep 2021 05:23)    PT/INR - ( 24 Sep 2021 18:28 )   PT: 11.8 sec;   INR: 1.02 ratio         PTT - ( 24 Sep 2021 18:28 )  PTT:34.2 sec    CULTURES:  Rapid RVP Result: NotDetec (09-24 @ 18:42)      Physical Examination:    General: No acute distress. Intubated. Awake to sound.    HEENT: Pupils equal, reactive to light.  Symmetric.    PULM: Clear to auscultation bilaterally, no significant sputum production    CVS: Regular rate and rhythm, no murmurs, rubs, or gallops    ABD: Soft, nondistended, nontender, normoactive bowel sounds, no masses    EXT: No edema, nontender    SKIN: Warm and well perfused, no rashes noted.    NEURO: Follows commands. Moves all 4 extremities grossly. EOMI.     RADIOLOGY:   CT Chest 9/24/21: Mild emphysema. No PE.  CT Head 9/24/21: No acute findings. Stable arachnoid cyst from 2013.     CRITICAL CARE TIME SPENT: Critical Care time: 35 mins assessing presenting problems of acute illness that poses high probability of life threatening deterioration or end organ damage/dysfunction.  Medical decision making inculding Initiating plan of care, reviewing data, reviewing radiology,direct patient bedside evaluation and interpretation of vital signs, any necessary ventilator management , discusion with multidisciplinary team, discussing goals of care with patient/family, all non inclusive of procedures

## 2021-09-25 NOTE — PROGRESS NOTE ADULT - ASSESSMENT
x Patient is a 61 year old female with PMH COPD on 2-3L NC  O2 at home, RA on MTX, chronic back pain, smoker, neuropathy. Recent admission for COPD exacerbation, discharged 9/14, finished steroid taper a few days ago. She presented for worsening dyspnea for 1 day that progressed to her partner calling 911 and she was found to have an O2sat in the 70s, increased to high 80s on NRB. EMS reported seizure like activity in route and she was given Versed 2.5mg IVP, which resolved shortly after. She was intubated secondary to her being obtunded on arrival to [place].     Patient is currently on 40 propofol, 1 fentanyl, and maintenance IVF.

## 2021-09-25 NOTE — PROGRESS NOTE ADULT - PROBLEM SELECTOR PLAN 2
--Intubated on minimal vent settings. Wean as able.   -On ceftriaxone and azithromycin.  -Failed pressure support trial. Will try again today and extubate when able.  -Solu-medtrol 20mg, q8h.   -Jj ordered.

## 2021-09-25 NOTE — PROGRESS NOTE ADULT - ASSESSMENT
Patient is a 61 year old female with PMH COPD on 2-3L NC  O2 at home, RA on MTX, chronic back pain, smoker, neuropathy. Recent admission for COPD exacerbation, discharged 9/14, finished steroid taper a few days ago. She presented for worsening dyspnea for 1 day that progressed to her partner calling 911 and she was found to have an O2sat in the 70s, increased to high 80s on NRB. EMS reported seizure like activity in route and she was given Versed 2.5mg IVP, which resolved shortly after. She was intubated secondary to her being obtunded on arrival to [place].     Patient is currently extubated, so2 stable 2l nc. Pt states she did not have seizure, she was very sob and shaky -ems thought she is having seizure.     Acute on chronic hypoxic respiratory failure with hypercapnia sec copd exacerbation   -s/p extubation 09/25  -continue bronchodilator  -On ceftriaxone and azithromycin.  -telemonitor w/  - iv solumedrol  -spiriva    Hx Rheumatoid arthritis.   --Home dose of MTX 2.5mg (6 tabs every Tuesday).    Observed seizure-like activity.   -No recurrent activity  - EEG and will c/s neurology consult am     Neuropathy.   -Gabapentin 600mg TID.    Hx nicotin abuse  -Pt quit smoking last month   DVT PPX LOVENOX  GI PPX PROTONIX    Care of plan dw pt and sister at bedside in detailed  wilder rn  wilder micu team.

## 2021-09-25 NOTE — PROGRESS NOTE ADULT - SUBJECTIVE AND OBJECTIVE BOX
Patient is a 61y old  Female who presents with a chief complaint of Hypoxic Respiratory Failure (25 Sep 2021 09:54)  pt seen and exam. currently on 2L NC SO2 95%. S/P Extubation today afternoon. Pt is feeling better. pt denies cp,sob,cough,fever,chill. Tolerating po diet.  denies seizure /tremor.      REVIEW OF SYSTEMS: All systems are reviewed and found to be negative except above    MEDICATIONS  (STANDING):  ALBUTerol    90 MICROgram(s) HFA Inhaler 2 Puff(s) Inhalation every 6 hours  azithromycin  IVPB      buDESOnide    Inhalation Suspension 0.5 milliGRAM(s) Inhalation every 12 hours  cefTRIAXone   IVPB 1000 milliGRAM(s) IV Intermittent every 24 hours  chlorhexidine 4% Liquid 1 Application(s) Topical <User Schedule>  dextrose 40% Gel 15 Gram(s) Oral once  dextrose 5%. 1000 milliLiter(s) (50 mL/Hr) IV Continuous <Continuous>  dextrose 5%. 1000 milliLiter(s) (100 mL/Hr) IV Continuous <Continuous>  dextrose 50% Injectable 25 Gram(s) IV Push once  dextrose 50% Injectable 12.5 Gram(s) IV Push once  dextrose 50% Injectable 25 Gram(s) IV Push once  enoxaparin Injectable 40 milliGRAM(s) SubCutaneous daily  folic acid 1 milliGRAM(s) Oral daily  gabapentin 600 milliGRAM(s) Oral three times a day  glucagon  Injectable 1 milliGRAM(s) IntraMuscular once  insulin lispro (ADMELOG) corrective regimen sliding scale   SubCutaneous every 6 hours  lactated ringers. 1000 milliLiter(s) (75 mL/Hr) IV Continuous <Continuous>  methylPREDNISolone sodium succinate Injectable 40 milliGRAM(s) IV Push every 8 hours  pantoprazole  Injectable 40 milliGRAM(s) IV Push daily  tiotropium 18 MICROgram(s) Capsule 1 Capsule(s) Inhalation daily    MEDICATIONS  (PRN):  LORazepam   Injectable 2 milliGRAM(s) IV Push daily PRN seizure  oxycodone    5 mG/acetaminophen 325 mG 1 Tablet(s) Oral every 6 hours PRN Moderate Pain (4 - 6)      CAPILLARY BLOOD GLUCOSE      POCT Blood Glucose.: 174 mg/dL (25 Sep 2021 11:30)  POCT Blood Glucose.: 150 mg/dL (25 Sep 2021 05:23)  POCT Blood Glucose.: 161 mg/dL (24 Sep 2021 18:16)    I&O's Summary    24 Sep 2021 07:01  -  25 Sep 2021 07:00  --------------------------------------------------------  IN: 1749.6 mL / OUT: 470 mL / NET: 1279.6 mL    25 Sep 2021 07:01  -  25 Sep 2021 17:22  --------------------------------------------------------  IN: 576 mL / OUT: 975 mL / NET: -399 mL        PHYSICAL EXAM:  Vital Signs Last 24 Hrs  T(C): 36.8 (25 Sep 2021 17:07), Max: 37.5 (25 Sep 2021 04:00)  T(F): 98.2 (25 Sep 2021 17:07), Max: 99.5 (25 Sep 2021 04:00)  HR: 103 (25 Sep 2021 17:00) (67 - 150)  BP: 125/80 (25 Sep 2021 17:00) (86/55 - 141/77)  BP(mean): 95 (25 Sep 2021 17:00) (65 - 108)  RR: 17 (25 Sep 2021 17:00) (12 - 30)  SpO2: 95% (25 Sep 2021 17:00) (94% - 100%)    CONSTITUTIONAL: NAD,  EYES: PERRLA; conjunctiva and sclera clear  ENMT: Moist oral mucosa,   RESPIRATORY: Normal respiratory effort; Mild wheeze to auscultation bilaterally. no r/r  CARDIOVASCULAR: Regular rate and rhythm, normal S1 and S2, no murmur   EXTS: No lower extremity edema; Peripheral pulses are 2+ bilaterally  ABDOMEN: Nontender to palpation, normoactive bowel sounds, no rebound/guarding;   MUSCLOSKELETAL:   no joint swelling or tenderness to palpation  PSYCH: affect appropriate  NEUROLOGY: A+O to person, place, and time; CN 2-12 are intact and symmetric; no gross sensory deficits;       LABS:                        11.8   12.05 )-----------( 245      ( 25 Sep 2021 05:09 )             37.0     09-25    137  |  99  |  12.1  ----------------------------<  150<H>  4.2   |  23.0  |  0.53    Ca    8.8      25 Sep 2021 05:09  Phos  2.2     09-25  Mg     2.0     09-25    TPro  6.6  /  Alb  4.0  /  TBili  0.4  /  DBili  x   /  AST  18  /  ALT  13  /  AlkPhos  78  09-25    PT/INR - ( 24 Sep 2021 18:28 )   PT: 11.8 sec;   INR: 1.02 ratio         PTT - ( 24 Sep 2021 18:28 )  PTT:34.2 sec  CARDIAC MARKERS ( 24 Sep 2021 18:28 )  x     / <0.01 ng/mL / x     / x     / x                RADIOLOGY & ADDITIONAL TESTS:  Results Reviewed:   Imaging Personally Reviewed:  Electrocardiogram Personally Reviewed:    COORDINATION OF CARE:  Care Discussed with Consultants/Other Providers [Y/N]:  Prior or Outpatient Records Reviewed [Y/N]:

## 2021-09-25 NOTE — PROGRESS NOTE ADULT - PROBLEM SELECTOR PLAN 1
-Intubated on minimal vent settings. Wean as able.   -On ceftriaxone and azithromycin.  -Failed pressure support trial. Will try again today and extubate when able.

## 2021-09-25 NOTE — PATIENT PROFILE ADULT - FUNCTIONAL SCREEN CURRENT LEVEL: COMMUNICATION, MLM
unable to assess pt sedated/intubated/3 = unable to understand or speak (not related to language barrier)

## 2021-09-26 LAB
A1C WITH ESTIMATED AVERAGE GLUCOSE RESULT: 5.7 % — HIGH (ref 4–5.6)
ALBUMIN SERPL ELPH-MCNC: 3.8 G/DL — SIGNIFICANT CHANGE UP (ref 3.3–5.2)
ALP SERPL-CCNC: 77 U/L — SIGNIFICANT CHANGE UP (ref 40–120)
ALT FLD-CCNC: 19 U/L — SIGNIFICANT CHANGE UP
ANION GAP SERPL CALC-SCNC: 10 MMOL/L — SIGNIFICANT CHANGE UP (ref 5–17)
ANISOCYTOSIS BLD QL: SLIGHT — SIGNIFICANT CHANGE UP
AST SERPL-CCNC: 26 U/L — SIGNIFICANT CHANGE UP
BASOPHILS # BLD AUTO: 0 K/UL — SIGNIFICANT CHANGE UP (ref 0–0.2)
BASOPHILS NFR BLD AUTO: 0 % — SIGNIFICANT CHANGE UP (ref 0–2)
BILIRUB SERPL-MCNC: 0.3 MG/DL — LOW (ref 0.4–2)
BUN SERPL-MCNC: 15 MG/DL — SIGNIFICANT CHANGE UP (ref 8–20)
BURR CELLS BLD QL SMEAR: PRESENT — SIGNIFICANT CHANGE UP
CALCIUM SERPL-MCNC: 9.1 MG/DL — SIGNIFICANT CHANGE UP (ref 8.6–10.2)
CHLORIDE SERPL-SCNC: 105 MMOL/L — SIGNIFICANT CHANGE UP (ref 98–107)
CO2 SERPL-SCNC: 26 MMOL/L — SIGNIFICANT CHANGE UP (ref 22–29)
CREAT SERPL-MCNC: 0.42 MG/DL — LOW (ref 0.5–1.3)
DACRYOCYTES BLD QL SMEAR: SLIGHT — SIGNIFICANT CHANGE UP
ELLIPTOCYTES BLD QL SMEAR: SLIGHT — SIGNIFICANT CHANGE UP
EOSINOPHIL # BLD AUTO: 0 K/UL — SIGNIFICANT CHANGE UP (ref 0–0.5)
EOSINOPHIL NFR BLD AUTO: 0 % — SIGNIFICANT CHANGE UP (ref 0–6)
ESTIMATED AVERAGE GLUCOSE: 117 MG/DL — HIGH (ref 68–114)
GIANT PLATELETS BLD QL SMEAR: PRESENT — SIGNIFICANT CHANGE UP
GLUCOSE BLDC GLUCOMTR-MCNC: 133 MG/DL — HIGH (ref 70–99)
GLUCOSE BLDC GLUCOMTR-MCNC: 134 MG/DL — HIGH (ref 70–99)
GLUCOSE BLDC GLUCOMTR-MCNC: 166 MG/DL — HIGH (ref 70–99)
GLUCOSE SERPL-MCNC: 144 MG/DL — HIGH (ref 70–99)
HCT VFR BLD CALC: 36.1 % — SIGNIFICANT CHANGE UP (ref 34.5–45)
HCT VFR BLD CALC: 37.6 % — SIGNIFICANT CHANGE UP (ref 34.5–45)
HGB BLD-MCNC: 11.7 G/DL — SIGNIFICANT CHANGE UP (ref 11.5–15.5)
HGB BLD-MCNC: 12.2 G/DL — SIGNIFICANT CHANGE UP (ref 11.5–15.5)
LACTATE BLDV-MCNC: 2.1 MMOL/L — HIGH (ref 0.5–2)
LACTATE BLDV-MCNC: 2.6 MMOL/L — HIGH (ref 0.5–2)
LYMPHOCYTES # BLD AUTO: 1.49 K/UL — SIGNIFICANT CHANGE UP (ref 1–3.3)
LYMPHOCYTES # BLD AUTO: 5.2 % — LOW (ref 13–44)
MACROCYTES BLD QL: SLIGHT — SIGNIFICANT CHANGE UP
MAGNESIUM SERPL-MCNC: 2.1 MG/DL — SIGNIFICANT CHANGE UP (ref 1.6–2.6)
MANUAL SMEAR VERIFICATION: SIGNIFICANT CHANGE UP
MCHC RBC-ENTMCNC: 31.1 PG — SIGNIFICANT CHANGE UP (ref 27–34)
MCHC RBC-ENTMCNC: 31.1 PG — SIGNIFICANT CHANGE UP (ref 27–34)
MCHC RBC-ENTMCNC: 32.4 GM/DL — SIGNIFICANT CHANGE UP (ref 32–36)
MCHC RBC-ENTMCNC: 32.4 GM/DL — SIGNIFICANT CHANGE UP (ref 32–36)
MCV RBC AUTO: 95.9 FL — SIGNIFICANT CHANGE UP (ref 80–100)
MCV RBC AUTO: 96 FL — SIGNIFICANT CHANGE UP (ref 80–100)
MICROCYTES BLD QL: SLIGHT — SIGNIFICANT CHANGE UP
MONOCYTES # BLD AUTO: 0 K/UL — SIGNIFICANT CHANGE UP (ref 0–0.9)
MONOCYTES NFR BLD AUTO: 0 % — LOW (ref 2–14)
NEUTROPHILS # BLD AUTO: 27.1 K/UL — HIGH (ref 1.8–7.4)
NEUTROPHILS NFR BLD AUTO: 94.8 % — HIGH (ref 43–77)
OVALOCYTES BLD QL SMEAR: SLIGHT — SIGNIFICANT CHANGE UP
PHOSPHATE SERPL-MCNC: 2.9 MG/DL — SIGNIFICANT CHANGE UP (ref 2.4–4.7)
PLAT MORPH BLD: NORMAL — SIGNIFICANT CHANGE UP
PLATELET # BLD AUTO: 253 K/UL — SIGNIFICANT CHANGE UP (ref 150–400)
PLATELET # BLD AUTO: 255 K/UL — SIGNIFICANT CHANGE UP (ref 150–400)
PLATELET COUNT - ESTIMATE: NORMAL — SIGNIFICANT CHANGE UP
POIKILOCYTOSIS BLD QL AUTO: SLIGHT — SIGNIFICANT CHANGE UP
POLYCHROMASIA BLD QL SMEAR: SLIGHT — SIGNIFICANT CHANGE UP
POTASSIUM SERPL-MCNC: 4.2 MMOL/L — SIGNIFICANT CHANGE UP (ref 3.5–5.3)
POTASSIUM SERPL-SCNC: 4.2 MMOL/L — SIGNIFICANT CHANGE UP (ref 3.5–5.3)
PROCALCITONIN SERPL-MCNC: 0.36 NG/ML — HIGH (ref 0.02–0.1)
PROT SERPL-MCNC: 6.4 G/DL — LOW (ref 6.6–8.7)
RBC # BLD: 3.76 M/UL — LOW (ref 3.8–5.2)
RBC # BLD: 3.92 M/UL — SIGNIFICANT CHANGE UP (ref 3.8–5.2)
RBC # FLD: 13.5 % — SIGNIFICANT CHANGE UP (ref 10.3–14.5)
RBC # FLD: 13.6 % — SIGNIFICANT CHANGE UP (ref 10.3–14.5)
RBC BLD AUTO: NORMAL — SIGNIFICANT CHANGE UP
SODIUM SERPL-SCNC: 141 MMOL/L — SIGNIFICANT CHANGE UP (ref 135–145)
WBC # BLD: 27.53 K/UL — HIGH (ref 3.8–10.5)
WBC # BLD: 28.59 K/UL — HIGH (ref 3.8–10.5)
WBC # FLD AUTO: 27.53 K/UL — HIGH (ref 3.8–10.5)
WBC # FLD AUTO: 28.59 K/UL — HIGH (ref 3.8–10.5)

## 2021-09-26 PROCEDURE — 71045 X-RAY EXAM CHEST 1 VIEW: CPT | Mod: 26

## 2021-09-26 PROCEDURE — 95720 EEG PHY/QHP EA INCR W/VEEG: CPT

## 2021-09-26 PROCEDURE — 99222 1ST HOSP IP/OBS MODERATE 55: CPT

## 2021-09-26 PROCEDURE — 99233 SBSQ HOSP IP/OBS HIGH 50: CPT

## 2021-09-26 RX ADMIN — OXYCODONE AND ACETAMINOPHEN 1 TABLET(S): 5; 325 TABLET ORAL at 22:38

## 2021-09-26 RX ADMIN — TIOTROPIUM BROMIDE 1 CAPSULE(S): 18 CAPSULE ORAL; RESPIRATORY (INHALATION) at 08:25

## 2021-09-26 RX ADMIN — Medication 1 MILLIGRAM(S): at 09:20

## 2021-09-26 RX ADMIN — CHLORHEXIDINE GLUCONATE 1 APPLICATION(S): 213 SOLUTION TOPICAL at 13:49

## 2021-09-26 RX ADMIN — Medication 40 MILLIGRAM(S): at 09:20

## 2021-09-26 RX ADMIN — Medication 1: at 18:11

## 2021-09-26 RX ADMIN — GABAPENTIN 600 MILLIGRAM(S): 400 CAPSULE ORAL at 13:52

## 2021-09-26 RX ADMIN — Medication 40 MILLIGRAM(S): at 18:13

## 2021-09-26 RX ADMIN — GABAPENTIN 600 MILLIGRAM(S): 400 CAPSULE ORAL at 05:10

## 2021-09-26 RX ADMIN — OXYCODONE AND ACETAMINOPHEN 1 TABLET(S): 5; 325 TABLET ORAL at 23:00

## 2021-09-26 RX ADMIN — Medication 0.5 MILLIGRAM(S): at 20:15

## 2021-09-26 RX ADMIN — Medication 40 MILLIGRAM(S): at 02:13

## 2021-09-26 RX ADMIN — OXYCODONE AND ACETAMINOPHEN 1 TABLET(S): 5; 325 TABLET ORAL at 14:26

## 2021-09-26 RX ADMIN — OXYCODONE AND ACETAMINOPHEN 1 TABLET(S): 5; 325 TABLET ORAL at 05:10

## 2021-09-26 RX ADMIN — Medication 0.5 MILLIGRAM(S): at 08:25

## 2021-09-26 RX ADMIN — PANTOPRAZOLE SODIUM 40 MILLIGRAM(S): 20 TABLET, DELAYED RELEASE ORAL at 09:21

## 2021-09-26 RX ADMIN — GABAPENTIN 600 MILLIGRAM(S): 400 CAPSULE ORAL at 22:20

## 2021-09-26 RX ADMIN — ENOXAPARIN SODIUM 40 MILLIGRAM(S): 100 INJECTION SUBCUTANEOUS at 22:21

## 2021-09-26 RX ADMIN — OXYCODONE AND ACETAMINOPHEN 1 TABLET(S): 5; 325 TABLET ORAL at 13:52

## 2021-09-26 RX ADMIN — CEFTRIAXONE 100 MILLIGRAM(S): 500 INJECTION, POWDER, FOR SOLUTION INTRAMUSCULAR; INTRAVENOUS at 05:11

## 2021-09-26 RX ADMIN — AZITHROMYCIN 255 MILLIGRAM(S): 500 TABLET, FILM COATED ORAL at 09:20

## 2021-09-26 RX ADMIN — OXYCODONE AND ACETAMINOPHEN 1 TABLET(S): 5; 325 TABLET ORAL at 06:00

## 2021-09-26 NOTE — PROGRESS NOTE ADULT - SUBJECTIVE AND OBJECTIVE BOX
Patient is a 61y old  Female who presents with a chief complaint of Hypoxic Respiratory Failure (25 Sep 2021 17:22)  pt denies cp,sob,dysuria,cough,n/v/d,fever,chill.  breathing stable 2 L NC      REVIEW OF SYSTEMS: All systems are reviewed and found to be negative except above    MEDICATIONS  (STANDING):  ALBUTerol    90 MICROgram(s) HFA Inhaler 2 Puff(s) Inhalation every 6 hours  azithromycin  IVPB 500 milliGRAM(s) IV Intermittent every 24 hours  azithromycin  IVPB      buDESOnide    Inhalation Suspension 0.5 milliGRAM(s) Inhalation every 12 hours  cefTRIAXone   IVPB 1000 milliGRAM(s) IV Intermittent every 24 hours  chlorhexidine 4% Liquid 1 Application(s) Topical <User Schedule>  dextrose 40% Gel 15 Gram(s) Oral once  dextrose 5%. 1000 milliLiter(s) (50 mL/Hr) IV Continuous <Continuous>  dextrose 5%. 1000 milliLiter(s) (100 mL/Hr) IV Continuous <Continuous>  dextrose 50% Injectable 25 Gram(s) IV Push once  dextrose 50% Injectable 12.5 Gram(s) IV Push once  dextrose 50% Injectable 25 Gram(s) IV Push once  enoxaparin Injectable 40 milliGRAM(s) SubCutaneous daily  folic acid 1 milliGRAM(s) Oral daily  gabapentin 600 milliGRAM(s) Oral three times a day  glucagon  Injectable 1 milliGRAM(s) IntraMuscular once  insulin lispro (ADMELOG) corrective regimen sliding scale   SubCutaneous every 6 hours  lactated ringers. 1000 milliLiter(s) (75 mL/Hr) IV Continuous <Continuous>  methylPREDNISolone sodium succinate Injectable 40 milliGRAM(s) IV Push every 8 hours  pantoprazole  Injectable 40 milliGRAM(s) IV Push daily  tiotropium 18 MICROgram(s) Capsule 1 Capsule(s) Inhalation daily    MEDICATIONS  (PRN):  LORazepam   Injectable 2 milliGRAM(s) IV Push daily PRN seizure  oxycodone    5 mG/acetaminophen 325 mG 1 Tablet(s) Oral every 6 hours PRN Moderate Pain (4 - 6)      CAPILLARY BLOOD GLUCOSE      POCT Blood Glucose.: 133 mg/dL (26 Sep 2021 12:41)  POCT Blood Glucose.: 134 mg/dL (26 Sep 2021 05:17)  POCT Blood Glucose.: 150 mg/dL (25 Sep 2021 23:39)  POCT Blood Glucose.: 116 mg/dL (25 Sep 2021 18:05)    I&O's Summary    25 Sep 2021 07:01  -  26 Sep 2021 07:00  --------------------------------------------------------  IN: 576 mL / OUT: 975 mL / NET: -399 mL        PHYSICAL EXAM:  Vital Signs Last 24 Hrs  T(C): 36.7 (26 Sep 2021 10:00), Max: 37.1 (25 Sep 2021 18:43)  T(F): 98 (26 Sep 2021 10:00), Max: 98.8 (25 Sep 2021 18:43)  HR: 72 (26 Sep 2021 10:00) (68 - 106)  BP: 124/71 (26 Sep 2021 10:00) (113/68 - 125/80)  BP(mean): 80 (25 Sep 2021 19:00) (80 - 95)  RR: 18 (26 Sep 2021 10:00) (17 - 25)  SpO2: 97% (26 Sep 2021 10:00) (94% - 98%)    CONSTITUTIONAL: NAD,  EYES: PERRLA; conjunctiva and sclera clear  ENMT: Moist oral mucosa,   RESPIRATORY: Normal respiratory effort; MILD WHEEZE to auscultation bilaterally  CARDIOVASCULAR: Regular rate and rhythm, normal S1 and S2, no murmur   EXTS: No lower extremity edema; Peripheral pulses are 2+ bilaterally  ABDOMEN: Nontender to palpation, normoactive bowel sounds, no rebound/guarding;   MUSCLOSKELETAL:   no joint swelling or tenderness to palpation  PSYCH: affect appropriate  NEUROLOGY: A+O to person, place, and time; CN 2-12 are intact and symmetric; no gross sensory deficits;       LABS:                        12.2   27.53 )-----------( 253      ( 26 Sep 2021 12:47 )             37.6     09-26    141  |  105  |  15.0  ----------------------------<  144<H>  4.2   |  26.0  |  0.42<L>    Ca    9.1      26 Sep 2021 06:05  Phos  2.9     09-26  Mg     2.1     09-26    TPro  6.4<L>  /  Alb  3.8  /  TBili  0.3<L>  /  DBili  x   /  AST  26  /  ALT  19  /  AlkPhos  77  09-26    PT/INR - ( 24 Sep 2021 18:28 )   PT: 11.8 sec;   INR: 1.02 ratio         PTT - ( 24 Sep 2021 18:28 )  PTT:34.2 sec  CARDIAC MARKERS ( 24 Sep 2021 18:28 )  x     / <0.01 ng/mL / x     / x     / x                RADIOLOGY & ADDITIONAL TESTS:  Results Reviewed:   Imaging Personally Reviewed:  Electrocardiogram Personally Reviewed:    COORDINATION OF CARE:  Care Discussed with Consultants/Other Providers [Y/N]:  Prior or Outpatient Records Reviewed [Y/N]:

## 2021-09-26 NOTE — PROGRESS NOTE ADULT - ASSESSMENT
Patient is a 61 year old female with PMH COPD on 2-3L NC  O2 at home, RA on MTX, chronic back pain, smoker, neuropathy. Recent admission for COPD exacerbation, discharged 9/14, finished steroid taper a few days ago. She presented for worsening dyspnea for 1 day that progressed to her partner calling 911 and she was found to have an O2sat in the 70s, increased to high 80s on NRB. EMS reported seizure like activity in route and she was given Versed 2.5mg IVP, which resolved shortly after. She was intubated secondary to her being obtunded on arrival to [place].     Patient is currently extubated, so2 stable 2l nc. Pt states she did not have seizure, she was very sob and shaky -ems thought she is having seizure.       Leukocytosis w/lactic acidosis  - pt is afebrile, no sign of infection  -on steroid   -on abx  -will order cxr,UA.PROCAL  - monitor closely  -cbc am.    Acute on chronic hypoxic respiratory failure with hypercapnia sec copd exacerbation   -s/p extubation 09/25  -continue bronchodilator  -On ceftriaxone and azithromycin.  -telemonitor w/  - iv solumedrol  -spiriva    Hx Rheumatoid arthritis.   --Home dose of MTX 2.5mg (6 tabs every Tuesday).    Observed seizure-like activity.   -No recurrent activity  - EEG and will c/s neurology consult am     Neuropathy.   -Gabapentin 600mg TID.    Hx nicotin abuse  -Pt quit smoking last month   DVT PPX LOVENOX  GI PPX PROTONIX    Care of plan dw pt  in detailed  dw rn

## 2021-09-26 NOTE — CONSULT NOTE ADULT - SUBJECTIVE AND OBJECTIVE BOX
Epilepsy Consult Note  [facility] at [place] [address] [place] [address]  [phone]    HPI:  60 yo woman with medical conditions as outlined below admitted for shortness of breath likely 2/2 COPD exacerbation. Girlfriend at bedside states that patient was very short of breath, so EMS was called. When she got in the ambulance, her whole body turned red and her arms were flexed and stiff. Her whole body began to shake and patient was unable to speak. She states they then intubated her, sedated her and brought her to the ED. Patient does not recall this happening. She denies a personal or family history of seizures, recent fever, learning difficulties growing up or prior head trauma. She was extubated yesterday. She states she feels back to baseline, but is still more short of breath than usual.      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 1000 milliLiter(s) (50 mL/Hr) IV Continuous <Continuous>  dextrose 5%. 1000 milliLiter(s) (100 mL/Hr) IV Continuous <Continuous>  dextrose 50% Injectable 25 Gram(s) IV Push once  dextrose 50% Injectable 12.5 Gram(s) IV Push once  dextrose 50% Injectable 25 Gram(s) IV Push once  enoxaparin Injectable 40 milliGRAM(s) SubCutaneous daily  folic acid 1 milliGRAM(s) Oral daily  gabapentin 600 milliGRAM(s) Oral three times a day  glucagon  Injectable 1 milliGRAM(s) IntraMuscular once  insulin lispro (ADMELOG) corrective regimen sliding scale   SubCutaneous every 6 hours  lactated ringers. 1000 milliLiter(s) (75 mL/Hr) IV Continuous <Continuous>  methylPREDNISolone sodium succinate Injectable 40 milliGRAM(s) IV Push every 8 hours  pantoprazole  Injectable 40 milliGRAM(s) IV Push daily  tiotropium 18 MICROgram(s) Capsule 1 Capsule(s) Inhalation daily    MEDICATIONS  (PRN):  LORazepam   Injectable 2 milliGRAM(s) IV Push daily PRN seizure  oxycodone    5 mG/acetaminophen 325 mG 1 Tablet(s) Oral every 6 hours PRN Moderate Pain (4 - 6)    Allergies:  NKA    FamHx:  DM    SocHx:   Quit smoking last month    ROS: A 12 system review of system was negative aside from pertinent negatives and positives outlined in HPI      Physical Exam  Vital Signs Last 24 Hrs  T(C): 36.7 (26 Sep 2021 10:00), Max: 37.1 (25 Sep 2021 18:43)  T(F): 98 (26 Sep 2021 10:00), Max: 98.8 (25 Sep 2021 18:43)  HR: 72 (26 Sep 2021 10:00) (68 - 106)  BP: 124/71 (26 Sep 2021 10:00) (113/68 - 125/80)  BP(mean): 80 (25 Sep 2021 19:00) (80 - 95)  RR: 18 (26 Sep 2021 10:00) (17 - 25)  SpO2: 97% (26 Sep 2021 10:00) (94% - 98%)  General: no acute distress  HEENT:NC/AT  Lungs: on nasal cannula  Skin: no rash or ecchymoses  Lower extremities: no edema    Mental Status: AAO x 3, no dysarthria, no aphasia  CN: PERRL, EOMI, VFF, V1-V3 sensation intact, no facial asymmetry  Motor:  5/5 x 4 extremities  Sensory: intact to light touch throughout  Coordination: no dysmetria on FTN  Gait: deferred      LABS:  cret                        12.2   27.53 )-----------( 253      ( 26 Sep 2021 12:47 )             37.6     09-26    141  |  105  |  15.0  ----------------------------<  144<H>  4.2   |  26.0  |  0.42<L>    Ca    9.1      26 Sep 2021 06:05  Phos  2.9     09-26  Mg     2.1     09-26    TPro  6.4<L>  /  Alb  3.8  /  TBili  0.3<L>  /  DBili  x   /  AST  26  /  ALT  19  /  AlkPhos  77  09-26    PT/INR - ( 24 Sep 2021 18:28 )   PT: 11.8 sec;   INR: 1.02 ratio         PTT - ( 24 Sep 2021 18:28 )  PTT:34.2 sec    CT head w/o contrast - No acute intracranial findings.   Prominence of the extra-axial space overlying the posterior fossa, left greater than right, reflecting an arachnoid cyst versus mayco-cisterna magna. Mild mass effect on underlying parenchyma cannot be excluded. These findings appear unchanged since prior exams dating back to 2013.

## 2021-09-26 NOTE — CONSULT NOTE ADULT - ASSESSMENT
60 yo woman admitted for COPD exacerbation (hypoxic to 70s at home) with likely convulsive seizure en route to hospital    Seizure  continuous vEEG  MRI brain w/wo contrast  Seizure precautions  No indication for antiepileptic medication at this time    Dr. Fernandes to follow patient tomorrow

## 2021-09-27 LAB
ANION GAP SERPL CALC-SCNC: 13 MMOL/L — SIGNIFICANT CHANGE UP (ref 5–17)
BUN SERPL-MCNC: 14.9 MG/DL — SIGNIFICANT CHANGE UP (ref 8–20)
CALCIUM SERPL-MCNC: 9.2 MG/DL — SIGNIFICANT CHANGE UP (ref 8.6–10.2)
CHLORIDE SERPL-SCNC: 104 MMOL/L — SIGNIFICANT CHANGE UP (ref 98–107)
CO2 SERPL-SCNC: 23 MMOL/L — SIGNIFICANT CHANGE UP (ref 22–29)
CREAT SERPL-MCNC: 0.53 MG/DL — SIGNIFICANT CHANGE UP (ref 0.5–1.3)
GLUCOSE BLDC GLUCOMTR-MCNC: 115 MG/DL — HIGH (ref 70–99)
GLUCOSE BLDC GLUCOMTR-MCNC: 118 MG/DL — HIGH (ref 70–99)
GLUCOSE BLDC GLUCOMTR-MCNC: 123 MG/DL — HIGH (ref 70–99)
GLUCOSE BLDC GLUCOMTR-MCNC: 127 MG/DL — HIGH (ref 70–99)
GLUCOSE BLDC GLUCOMTR-MCNC: 145 MG/DL — HIGH (ref 70–99)
GLUCOSE BLDC GLUCOMTR-MCNC: 163 MG/DL — HIGH (ref 70–99)
GLUCOSE BLDC GLUCOMTR-MCNC: 166 MG/DL — HIGH (ref 70–99)
GLUCOSE SERPL-MCNC: 152 MG/DL — HIGH (ref 70–99)
HCT VFR BLD CALC: 36.9 % — SIGNIFICANT CHANGE UP (ref 34.5–45)
HGB BLD-MCNC: 11.9 G/DL — SIGNIFICANT CHANGE UP (ref 11.5–15.5)
MCHC RBC-ENTMCNC: 31 PG — SIGNIFICANT CHANGE UP (ref 27–34)
MCHC RBC-ENTMCNC: 32.2 GM/DL — SIGNIFICANT CHANGE UP (ref 32–36)
MCV RBC AUTO: 96.1 FL — SIGNIFICANT CHANGE UP (ref 80–100)
PLATELET # BLD AUTO: 150 K/UL — SIGNIFICANT CHANGE UP (ref 150–400)
POTASSIUM SERPL-MCNC: 4.1 MMOL/L — SIGNIFICANT CHANGE UP (ref 3.5–5.3)
POTASSIUM SERPL-SCNC: 4.1 MMOL/L — SIGNIFICANT CHANGE UP (ref 3.5–5.3)
PROCALCITONIN SERPL-MCNC: 0.18 NG/ML — HIGH (ref 0.02–0.1)
RBC # BLD: 3.84 M/UL — SIGNIFICANT CHANGE UP (ref 3.8–5.2)
RBC # FLD: 13.5 % — SIGNIFICANT CHANGE UP (ref 10.3–14.5)
SODIUM SERPL-SCNC: 140 MMOL/L — SIGNIFICANT CHANGE UP (ref 135–145)
WBC # BLD: 18.94 K/UL — HIGH (ref 3.8–10.5)
WBC # FLD AUTO: 18.94 K/UL — HIGH (ref 3.8–10.5)

## 2021-09-27 PROCEDURE — 95718 EEG PHYS/QHP 2-12 HR W/VEEG: CPT

## 2021-09-27 PROCEDURE — 99233 SBSQ HOSP IP/OBS HIGH 50: CPT

## 2021-09-27 PROCEDURE — 99232 SBSQ HOSP IP/OBS MODERATE 35: CPT

## 2021-09-27 RX ADMIN — GABAPENTIN 600 MILLIGRAM(S): 400 CAPSULE ORAL at 22:30

## 2021-09-27 RX ADMIN — CEFTRIAXONE 100 MILLIGRAM(S): 500 INJECTION, POWDER, FOR SOLUTION INTRAMUSCULAR; INTRAVENOUS at 05:48

## 2021-09-27 RX ADMIN — ENOXAPARIN SODIUM 40 MILLIGRAM(S): 100 INJECTION SUBCUTANEOUS at 22:31

## 2021-09-27 RX ADMIN — Medication 40 MILLIGRAM(S): at 10:13

## 2021-09-27 RX ADMIN — Medication 0.5 MILLIGRAM(S): at 08:27

## 2021-09-27 RX ADMIN — OXYCODONE AND ACETAMINOPHEN 1 TABLET(S): 5; 325 TABLET ORAL at 17:20

## 2021-09-27 RX ADMIN — AZITHROMYCIN 255 MILLIGRAM(S): 500 TABLET, FILM COATED ORAL at 10:13

## 2021-09-27 RX ADMIN — PANTOPRAZOLE SODIUM 40 MILLIGRAM(S): 20 TABLET, DELAYED RELEASE ORAL at 10:14

## 2021-09-27 RX ADMIN — Medication 40 MILLIGRAM(S): at 17:20

## 2021-09-27 RX ADMIN — GABAPENTIN 600 MILLIGRAM(S): 400 CAPSULE ORAL at 13:59

## 2021-09-27 RX ADMIN — GABAPENTIN 600 MILLIGRAM(S): 400 CAPSULE ORAL at 05:47

## 2021-09-27 RX ADMIN — CHLORHEXIDINE GLUCONATE 1 APPLICATION(S): 213 SOLUTION TOPICAL at 08:49

## 2021-09-27 RX ADMIN — Medication 1 MILLIGRAM(S): at 10:14

## 2021-09-27 RX ADMIN — Medication 40 MILLIGRAM(S): at 02:07

## 2021-09-27 NOTE — PROGRESS NOTE ADULT - SUBJECTIVE AND OBJECTIVE BOX
Patient is a 61y old  Female who presents with a chief complaint of Hypoxic Respiratory Failure (27 Sep 2021 10:54)  pt seen and exam. denies cp,sob,cough,fever,chill,dysuria    SUBJECTIVE / OVERNIGHT EVENTS:  REVIEW OF SYSTEMS: All systems are reviewed and found to be negative except above    MEDICATIONS  (STANDING):  ALBUTerol    90 MICROgram(s) HFA Inhaler 2 Puff(s) Inhalation every 6 hours  azithromycin  IVPB 500 milliGRAM(s) IV Intermittent every 24 hours  azithromycin  IVPB      buDESOnide    Inhalation Suspension 0.5 milliGRAM(s) Inhalation every 12 hours  cefTRIAXone   IVPB 1000 milliGRAM(s) IV Intermittent every 24 hours  chlorhexidine 4% Liquid 1 Application(s) Topical <User Schedule>  dextrose 40% Gel 15 Gram(s) Oral once  dextrose 5%. 1000 milliLiter(s) (50 mL/Hr) IV Continuous <Continuous>  dextrose 5%. 1000 milliLiter(s) (100 mL/Hr) IV Continuous <Continuous>  dextrose 50% Injectable 25 Gram(s) IV Push once  dextrose 50% Injectable 12.5 Gram(s) IV Push once  dextrose 50% Injectable 25 Gram(s) IV Push once  enoxaparin Injectable 40 milliGRAM(s) SubCutaneous daily  folic acid 1 milliGRAM(s) Oral daily  gabapentin 600 milliGRAM(s) Oral three times a day  glucagon  Injectable 1 milliGRAM(s) IntraMuscular once  insulin lispro (ADMELOG) corrective regimen sliding scale   SubCutaneous every 6 hours  lactated ringers. 1000 milliLiter(s) (75 mL/Hr) IV Continuous <Continuous>  methylPREDNISolone sodium succinate Injectable 40 milliGRAM(s) IV Push every 12 hours  pantoprazole  Injectable 40 milliGRAM(s) IV Push daily  tiotropium 18 MICROgram(s) Capsule 1 Capsule(s) Inhalation daily    MEDICATIONS  (PRN):  LORazepam   Injectable 2 milliGRAM(s) IV Push daily PRN seizure  oxycodone    5 mG/acetaminophen 325 mG 1 Tablet(s) Oral every 6 hours PRN Moderate Pain (4 - 6)      CAPILLARY BLOOD GLUCOSE      POCT Blood Glucose.: 115 mg/dL (27 Sep 2021 12:15)  POCT Blood Glucose.: 118 mg/dL (27 Sep 2021 07:56)  POCT Blood Glucose.: 123 mg/dL (27 Sep 2021 05:44)  POCT Blood Glucose.: 145 mg/dL (27 Sep 2021 00:24)  POCT Blood Glucose.: 166 mg/dL (26 Sep 2021 17:28)    I&O's Summary      PHYSICAL EXAM:  Vital Signs Last 24 Hrs  T(C): 36.6 (27 Sep 2021 11:00), Max: 36.7 (26 Sep 2021 17:00)  T(F): 97.8 (27 Sep 2021 11:00), Max: 98.1 (26 Sep 2021 17:00)  HR: 62 (27 Sep 2021 11:00) (60 - 78)  BP: 122/74 (27 Sep 2021 11:00) (117/69 - 134/75)  BP(mean): --  RR: 18 (27 Sep 2021 11:00) (18 - 18)  SpO2: 97% (27 Sep 2021 11:00) (96% - 97%)    CONSTITUTIONAL: NAD,  EYES: PERRLA; conjunctiva and sclera clear  ENMT: Moist oral mucosa,   RESPIRATORY: Normal respiratory effort; lungs are clear to auscultation bilaterally  CARDIOVASCULAR: Regular rate and rhythm, normal S1 and S2, no murmur   EXTS: No lower extremity edema; Peripheral pulses are 2+ bilaterally  ABDOMEN: Nontender to palpation, normoactive bowel sounds, no rebound/guarding;   MUSCLOSKELETAL:   no joint swelling or tenderness to palpation  PSYCH: affect appropriate  NEUROLOGY: A+O to person, place, and time; no gross  deficits;       LABS:                        12.2   27.53 )-----------( 253      ( 26 Sep 2021 12:47 )             37.6     09-26    141  |  105  |  15.0  ----------------------------<  144<H>  4.2   |  26.0  |  0.42<L>    Ca    9.1      26 Sep 2021 06:05  Phos  2.9     09-26  Mg     2.1     09-26    TPro  6.4<L>  /  Alb  3.8  /  TBili  0.3<L>  /  DBili  x   /  AST  26  /  ALT  19  /  AlkPhos  77  09-26              Culture - Blood (collected 24 Sep 2021 20:28)  Source: .Blood Blood-Venous  Preliminary Report (26 Sep 2021 21:00):    No growth at 48 hours    Culture - Blood (collected 24 Sep 2021 20:28)  Source: .Blood Blood  Preliminary Report (26 Sep 2021 21:00):    No growth at 48 hours    < from: Xray Chest 1 View-PORTABLE IMMEDIATE (Xray Chest 1 View-PORTABLE IMMEDIATE .) (09.26.21 @ 14:30) >    IMPRESSION: Clear lungs.    < end of copied text >      RADIOLOGY & ADDITIONAL TESTS:  Results Reviewed:   Imaging Personally Reviewed:  Electrocardiogram Personally Reviewed:    COORDINATION OF CARE:  Care Discussed with Consultants/Other Providers [Y/N]:  Prior or Outpatient Records Reviewed [Y/N]:

## 2021-09-27 NOTE — EEG REPORT - NS EEG TEXT BOX
[facility]  Epilepsy Monitoring Unit Report    [facility]: [address], [place] [address], Phone [phone]  [facility]: [address], [place] [address], Phone [phone]  [place] [facility]: [address], [address], [place] [address] Phone [phone]    [facility]: [address] [place] [address], Phone [phone]  [place] [facility]: [address] [place] [address], Phone [phone]    Patient Name: Connie Hobson    Age: 61 year, : 1960  Patient ID: -, MRN #: -, Lang: -    Physician Ordering Inpatient EEG: Elieser Gaines  Referral Source to EMU: non-elective admission – from Internal Medicine    EMU Study Started: 11:29 on 21    EMU Study Ended: pending discharge    Study Information:    EEG Recording Technique:  The patient underwent continuous Video-EEG monitoring, using Telemetry System hardware on the XLTek Digital System. EEG and video data were stored on a computer hard drive with important events saved in digital archive files. The material was reviewed by a physician (electroencephalographer / epileptologist) on a daily basis. Hari and seizure detection algorithms were utilized and reviewed. An EEG Technician attended to the patient, and was available throughout daytime work hours.  The epilepsy center neurologist was available in person or on call 24-hours per day.    EEG Placement and Labeling of Electrodes:  The EEG was performed utilizing 20 channel referential EEG connections (coronal over temporal over parasagittal montage) using all standard 10-20 electrode placements with EKG, with additional electrodes placed in the inferior temporal region using the modified 10-10 montage electrode placements for elective admissions, or if deemed necessary. Recording was at a sampling rate of 256 samples per second per channel. Time synchronized digital video recording was done simultaneously with EEG recording. A low light infrared camera was used for low light recording.         History:  62 yo woman with COPD, neuropathy, rheumatoid arthritis and benign spinal tumor admitted for shortness of breath likely 2/2 COPD exacerbation. Girlfriend at bedside states that patient was very short of breath, so EMS was called. When she got in the ambulance, her whole body turned red and her arms were flexed and stiff. Her whole body began to shake and patient was unable to speak. She states they then intubated her, sedated her and brought her to the ED.    Home Antiepileptic Medication and Device  GBP 600mg TID – neuropathy     Interpretation:    Start Date: 2021 – Day 1                                Start Time – 11:29      Duration – 18h 10m    Daily EEG Visual Analysis  Findings: The background was continuous and reactive. During wakefulness, the posterior dominant rhythm consisted of symmetric, well-modulated 8-9 Hz activity, with amplitude to 60 uV, that attenuated to eye opening.     Background Slowing:  No generalized background slowing was present.    Focal Slowing:   None were present.    Sleep Background:  Drowsiness was characterized by fragmentation, attenuation, and slowing of the background activity.    Sleep was characterized by the presence of vertex waves, symmetric sleep spindles and K-complexes.    Other Non-Epileptiform Findings:  None were present.    Interictal Epileptiform Activity:   None were present.    Events:  No events or seizures recorded.    Artifacts:  Intermittent myogenic and movement artifacts were noted.    ECG:  The heart rate on single channel ECG was predominantly between 60-70 BPM.    AED:  GBP 600mg TID    EEG Summary:  Normal EEG in the awake, drowsy and asleep states.    Impression/Clinical Correlate:   – : No events or seizures were recorded.    No events or seizures were recorded. Normal video EEG in awake, drowsy and asleep states.    ________________________________________    Paolo Fernandes MD  Director, Epilepsy/EMU - [facility]

## 2021-09-27 NOTE — PROGRESS NOTE ADULT - REASON FOR ADMISSION
Hypoxic Respiratory Failure

## 2021-09-27 NOTE — PROGRESS NOTE ADULT - ASSESSMENT
This is a 60yo RH female with history of COPD, neuropathy, rheumatoid arthritis and lumbar spine myxopapillary ependymoma s/p resection who presented with COPD exacerbation and seizure-like activity. Personally reviewed all imagines, labs, EEG and other studies.    Impression:  1. Seizure-like activity: Continuous video EEG normal. No history of or risk for seizures. Low suspicion for seizure.  2. COPD exacerbation: Resolved.  3. Neuropathy, rheumatoid arthritis    Recommendation:  - discontinue cvEEG  - no indication for antiepileptic medication at this time  - management of COPD per primary team      No acute intervention from Epilepsy at this time.  Please reconsult if needed.  ______________________  Paolo Fernandes MD   Director, Epilepsy/EMU - [facility]   Epilepsy Consult #: [phone] ([phone])

## 2021-09-27 NOTE — PROGRESS NOTE ADULT - SUBJECTIVE AND OBJECTIVE BOX
[facility]                                                                     MD Tatyana Hernández DO                                              Epilepsy Consult #: [phone] ([phone])                                               Office: [address] [place] [address], [facility]                                                 Phone: [phone]; Fax: [phone]                            ==============================================    EPILEPSY FOLLOW-UP NOTE      INTERVAL HISTORY:  Continuous video EEG normal. No event overnight.     MEDICATIONS:   ALBUTerol    90 MICROgram(s) HFA Inhaler 2 Puff(s) Inhalation every 6 hours  azithromycin  IVPB 500 milliGRAM(s) IV Intermittent every 24 hours  azithromycin  IVPB      buDESOnide    Inhalation Suspension 0.5 milliGRAM(s) Inhalation every 12 hours  cefTRIAXone   IVPB 1000 milliGRAM(s) IV Intermittent every 24 hours  chlorhexidine 4% Liquid 1 Application(s) Topical <User Schedule>  dextrose 40% Gel 15 Gram(s) Oral once  dextrose 5%. 1000 milliLiter(s) (50 mL/Hr) IV Continuous <Continuous>  dextrose 5%. 1000 milliLiter(s) (100 mL/Hr) IV Continuous <Continuous>  dextrose 50% Injectable 25 Gram(s) IV Push once  dextrose 50% Injectable 12.5 Gram(s) IV Push once  dextrose 50% Injectable 25 Gram(s) IV Push once  enoxaparin Injectable 40 milliGRAM(s) SubCutaneous daily  folic acid 1 milliGRAM(s) Oral daily  gabapentin 600 milliGRAM(s) Oral three times a day  glucagon  Injectable 1 milliGRAM(s) IntraMuscular once  insulin lispro (ADMELOG) corrective regimen sliding scale   SubCutaneous every 6 hours  lactated ringers. 1000 milliLiter(s) (75 mL/Hr) IV Continuous <Continuous>  LORazepam   Injectable 2 milliGRAM(s) IV Push daily PRN seizure  methylPREDNISolone sodium succinate Injectable 40 milliGRAM(s) IV Push every 8 hours  oxycodone    5 mG/acetaminophen 325 mG 1 Tablet(s) Oral every 6 hours PRN Moderate Pain (4 - 6)  pantoprazole  Injectable 40 milliGRAM(s) IV Push daily  tiotropium 18 MICROgram(s) Capsule 1 Capsule(s) Inhalation daily    LAST 24-HR VITALS:  T(C): 36.6 (09-27-21 @ 11:00), Max: 36.7 (09-26-21 @ 17:00)  HR: 62 (09-27-21 @ 11:00) (60 - 78)  BP: 122/74 (09-27-21 @ 11:00) (117/69 - 134/75)  ABP: --  RR: 18 (09-27-21 @ 11:00) (18 - 18)  SpO2: 97% (09-27-21 @ 11:00) (96% - 97%)  CVP(cm H2O): --    GENERAL PHYSICAL EXAM:  GEN: no distress   HEENT: NCAT, OP clear  EYES: sclera white, conjunctiva clear, no nystagmus  NECK: supple  CV: RRR, no murmur     		  PULM: no wheezing  ABD: soft, +BS, NT  EXT: peripheral pulse intact, no cyanosis  MSK: muscle tone and strength normal  SKIN: warm, dry    NEUROLOGICAL EXAM:  Mental Status  Orientation: alert and oriented to person, place, time, and situation   Language: clear and fluent    Cranial Nerves  II: full visual fields intact   III, IV, VI: PERRL, EOMI  V, VII: facial sensation and movement intact and symmetric   VIII: hearing intact   IX, X: uvula midline, soft palate elevates normally   XI: BL shoulder shrug intact   XII: tongue midline    Motor  5/5 BUE and BLE                 Tone and bulk are normal in upper and lower limbs  No pronator drift    Sensation  Intact to light touch and pinprick in all 4 EXTs    Reflex  2+ in BL biceps and patella                                    Plantar responses downward bilaterally    Coordination  Normal FTN bilaterally    Gait  Deferred       LABS:                          12.2   27.53 )-----------( 253      ( 26 Sep 2021 12:47 )             37.6     09-26    141  |  105  |  15.0  ----------------------------<  144<H>  4.2   |  26.0  |  0.42<L>    Ca    9.1      26 Sep 2021 06:05  Phos  2.9     09-26  Mg     2.1     09-26    TPro  6.4<L>  /  Alb  3.8  /  TBili  0.3<L>  /  DBili  x   /  AST  26  /  ALT  19  /  AlkPhos  77  09-26    CAPILLARY BLOOD GLUCOSE      POCT Blood Glucose.: 118 mg/dL (27 Sep 2021 07:56)  POCT Blood Glucose.: 123 mg/dL (27 Sep 2021 05:44)  POCT Blood Glucose.: 145 mg/dL (27 Sep 2021 00:24)  POCT Blood Glucose.: 166 mg/dL (26 Sep 2021 17:28)  POCT Blood Glucose.: 133 mg/dL (26 Sep 2021 12:41)    LIVER FUNCTIONS - ( 26 Sep 2021 06:05 )  Alb: 3.8 g/dL / Pro: 6.4 g/dL / ALK PHOS: 77 U/L / ALT: 19 U/L / AST: 26 U/L / GGT: x                 OTHER IMAGING AND STUDIES:    cvEEG 9/26 - 9/27/21:  EEG Summary:  Normal EEG in the awake, drowsy and asleep states.    Impression/Clinical Correlate:  9/26 – 9/27: No events or seizures were recorded.    No events or seizures were recorded. Normal video EEG in awake, drowsy and asleep states.

## 2021-09-27 NOTE — PROGRESS NOTE ADULT - ASSESSMENT
Patient is a 61 year old female with PMH COPD on 2-3L NC  O2 at home, RA on MTX, chronic back pain, smoker, neuropathy. Recent admission for COPD exacerbation, discharged 9/14, finished steroid taper a few days ago. She presented for worsening dyspnea for 1 day that progressed to her partner calling 911 and she was found to have an O2sat in the 70s, increased to high 80s on NRB. EMS reported seizure like activity in route and she was given Versed 2.5mg IVP, which resolved shortly after. She was intubated secondary to her being obtunded on arrival to [place].     Patient is currently extubated, so2 stable 2l nc. Pt states she did not have seizure, she was very sob and shaky -ems thought she is having seizure.       Leukocytosis w/lactic acidosis  - pt is afebrile, no sign of infection, likely from steroid  -on steroid   -on abx  - cxr stable,,UA pending .PROCAL pending  - monitor closely  -cbc am.    Acute on chronic hypoxic respiratory failure with hypercapnia sec copd exacerbation   -s/p extubation 09/25  -continue bronchodilator  -On ceftriaxone and dc  azithromycin.  -tele monitor w/  - iv solumedrol taper 40 mg q12  -spiriva  - ambulatory so2 w/PT Eval    Hx Rheumatoid arthritis.   --Home dose of MTX 2.5mg (6 tabs every Tuesday).    Observed seizure-like activity.   -No recurrent activity  - EEG  no seizure , cleared by neurology     Neuropathy.   -Gabapentin 600mg TID.    Hx nicotin abuse  -Pt quit smoking last month   DVT PPX LOVENOX  GI PPX PROTONIX    Care of plan wilder pt  in detailed  dw rn

## 2021-09-28 ENCOUNTER — TRANSCRIPTION ENCOUNTER (OUTPATIENT)
Age: 61
End: 2021-09-28

## 2021-09-28 VITALS
SYSTOLIC BLOOD PRESSURE: 120 MMHG | OXYGEN SATURATION: 94 % | TEMPERATURE: 98 F | DIASTOLIC BLOOD PRESSURE: 70 MMHG | RESPIRATION RATE: 18 BRPM | HEART RATE: 62 BPM

## 2021-09-28 LAB
ANION GAP SERPL CALC-SCNC: 13 MMOL/L — SIGNIFICANT CHANGE UP (ref 5–17)
BASOPHILS # BLD AUTO: 0.02 K/UL — SIGNIFICANT CHANGE UP (ref 0–0.2)
BASOPHILS NFR BLD AUTO: 0.1 % — SIGNIFICANT CHANGE UP (ref 0–2)
BUN SERPL-MCNC: 12.6 MG/DL — SIGNIFICANT CHANGE UP (ref 8–20)
CALCIUM SERPL-MCNC: 9 MG/DL — SIGNIFICANT CHANGE UP (ref 8.6–10.2)
CHLORIDE SERPL-SCNC: 105 MMOL/L — SIGNIFICANT CHANGE UP (ref 98–107)
CO2 SERPL-SCNC: 26 MMOL/L — SIGNIFICANT CHANGE UP (ref 22–29)
CREAT SERPL-MCNC: 0.46 MG/DL — LOW (ref 0.5–1.3)
EOSINOPHIL # BLD AUTO: 0 K/UL — SIGNIFICANT CHANGE UP (ref 0–0.5)
EOSINOPHIL NFR BLD AUTO: 0 % — SIGNIFICANT CHANGE UP (ref 0–6)
GLUCOSE BLDC GLUCOMTR-MCNC: 100 MG/DL — HIGH (ref 70–99)
GLUCOSE BLDC GLUCOMTR-MCNC: 150 MG/DL — HIGH (ref 70–99)
GLUCOSE SERPL-MCNC: 102 MG/DL — HIGH (ref 70–99)
HCT VFR BLD CALC: 35.9 % — SIGNIFICANT CHANGE UP (ref 34.5–45)
HGB BLD-MCNC: 11.8 G/DL — SIGNIFICANT CHANGE UP (ref 11.5–15.5)
IMM GRANULOCYTES NFR BLD AUTO: 0.9 % — SIGNIFICANT CHANGE UP (ref 0–1.5)
LYMPHOCYTES # BLD AUTO: 1.87 K/UL — SIGNIFICANT CHANGE UP (ref 1–3.3)
LYMPHOCYTES # BLD AUTO: 11.4 % — LOW (ref 13–44)
MCHC RBC-ENTMCNC: 31.8 PG — SIGNIFICANT CHANGE UP (ref 27–34)
MCHC RBC-ENTMCNC: 32.9 GM/DL — SIGNIFICANT CHANGE UP (ref 32–36)
MCV RBC AUTO: 96.8 FL — SIGNIFICANT CHANGE UP (ref 80–100)
MONOCYTES # BLD AUTO: 0.94 K/UL — HIGH (ref 0–0.9)
MONOCYTES NFR BLD AUTO: 5.8 % — SIGNIFICANT CHANGE UP (ref 2–14)
NEUTROPHILS # BLD AUTO: 13.36 K/UL — HIGH (ref 1.8–7.4)
NEUTROPHILS NFR BLD AUTO: 81.8 % — HIGH (ref 43–77)
PLATELET # BLD AUTO: 237 K/UL — SIGNIFICANT CHANGE UP (ref 150–400)
POTASSIUM SERPL-MCNC: 4.2 MMOL/L — SIGNIFICANT CHANGE UP (ref 3.5–5.3)
POTASSIUM SERPL-SCNC: 4.2 MMOL/L — SIGNIFICANT CHANGE UP (ref 3.5–5.3)
PROCALCITONIN SERPL-MCNC: 0.14 NG/ML — HIGH (ref 0.02–0.1)
RBC # BLD: 3.71 M/UL — LOW (ref 3.8–5.2)
RBC # FLD: 13.3 % — SIGNIFICANT CHANGE UP (ref 10.3–14.5)
SODIUM SERPL-SCNC: 144 MMOL/L — SIGNIFICANT CHANGE UP (ref 135–145)
WBC # BLD: 16.34 K/UL — HIGH (ref 3.8–10.5)
WBC # FLD AUTO: 16.34 K/UL — HIGH (ref 3.8–10.5)

## 2021-09-28 PROCEDURE — 84702 CHORIONIC GONADOTROPIN TEST: CPT

## 2021-09-28 PROCEDURE — 84100 ASSAY OF PHOSPHORUS: CPT

## 2021-09-28 PROCEDURE — 86901 BLOOD TYPING SEROLOGIC RH(D): CPT

## 2021-09-28 PROCEDURE — 0225U NFCT DS DNA&RNA 21 SARSCOV2: CPT

## 2021-09-28 PROCEDURE — 84484 ASSAY OF TROPONIN QUANT: CPT

## 2021-09-28 PROCEDURE — 82947 ASSAY GLUCOSE BLOOD QUANT: CPT

## 2021-09-28 PROCEDURE — 86850 RBC ANTIBODY SCREEN: CPT

## 2021-09-28 PROCEDURE — 85610 PROTHROMBIN TIME: CPT

## 2021-09-28 PROCEDURE — 83880 ASSAY OF NATRIURETIC PEPTIDE: CPT

## 2021-09-28 PROCEDURE — 95714 VEEG EA 12-26 HR UNMNTR: CPT

## 2021-09-28 PROCEDURE — G1004: CPT

## 2021-09-28 PROCEDURE — 83036 HEMOGLOBIN GLYCOSYLATED A1C: CPT

## 2021-09-28 PROCEDURE — 83735 ASSAY OF MAGNESIUM: CPT

## 2021-09-28 PROCEDURE — 83605 ASSAY OF LACTIC ACID: CPT

## 2021-09-28 PROCEDURE — 80053 COMPREHEN METABOLIC PANEL: CPT

## 2021-09-28 PROCEDURE — 94640 AIRWAY INHALATION TREATMENT: CPT

## 2021-09-28 PROCEDURE — 80048 BASIC METABOLIC PNL TOTAL CA: CPT

## 2021-09-28 PROCEDURE — 36600 WITHDRAWAL OF ARTERIAL BLOOD: CPT

## 2021-09-28 PROCEDURE — 86769 SARS-COV-2 COVID-19 ANTIBODY: CPT

## 2021-09-28 PROCEDURE — 99239 HOSP IP/OBS DSCHRG MGMT >30: CPT

## 2021-09-28 PROCEDURE — 85730 THROMBOPLASTIN TIME PARTIAL: CPT

## 2021-09-28 PROCEDURE — 85014 HEMATOCRIT: CPT

## 2021-09-28 PROCEDURE — 94002 VENT MGMT INPAT INIT DAY: CPT

## 2021-09-28 PROCEDURE — 85025 COMPLETE CBC W/AUTO DIFF WBC: CPT

## 2021-09-28 PROCEDURE — 96375 TX/PRO/DX INJ NEW DRUG ADDON: CPT

## 2021-09-28 PROCEDURE — 82803 BLOOD GASES ANY COMBINATION: CPT

## 2021-09-28 PROCEDURE — 82962 GLUCOSE BLOOD TEST: CPT

## 2021-09-28 PROCEDURE — 96374 THER/PROPH/DIAG INJ IV PUSH: CPT

## 2021-09-28 PROCEDURE — 84145 PROCALCITONIN (PCT): CPT

## 2021-09-28 PROCEDURE — 99285 EMERGENCY DEPT VISIT HI MDM: CPT | Mod: 25

## 2021-09-28 PROCEDURE — 82435 ASSAY OF BLOOD CHLORIDE: CPT

## 2021-09-28 PROCEDURE — 94003 VENT MGMT INPAT SUBQ DAY: CPT

## 2021-09-28 PROCEDURE — 85018 HEMOGLOBIN: CPT

## 2021-09-28 PROCEDURE — 71275 CT ANGIOGRAPHY CHEST: CPT | Mod: ME

## 2021-09-28 PROCEDURE — 87040 BLOOD CULTURE FOR BACTERIA: CPT

## 2021-09-28 PROCEDURE — 85027 COMPLETE CBC AUTOMATED: CPT

## 2021-09-28 PROCEDURE — 36415 COLL VENOUS BLD VENIPUNCTURE: CPT

## 2021-09-28 PROCEDURE — 70450 CT HEAD/BRAIN W/O DYE: CPT | Mod: ME

## 2021-09-28 PROCEDURE — 86900 BLOOD TYPING SEROLOGIC ABO: CPT

## 2021-09-28 PROCEDURE — 84295 ASSAY OF SERUM SODIUM: CPT

## 2021-09-28 PROCEDURE — 71045 X-RAY EXAM CHEST 1 VIEW: CPT

## 2021-09-28 PROCEDURE — 84132 ASSAY OF SERUM POTASSIUM: CPT

## 2021-09-28 PROCEDURE — 82330 ASSAY OF CALCIUM: CPT

## 2021-09-28 RX ORDER — TIOTROPIUM BROMIDE 18 UG/1
1 CAPSULE ORAL; RESPIRATORY (INHALATION)
Qty: 1 | Refills: 0
Start: 2021-09-28 | End: 2021-10-27

## 2021-09-28 RX ORDER — GABAPENTIN 400 MG/1
1 CAPSULE ORAL
Qty: 0 | Refills: 0 | DISCHARGE
Start: 2021-09-28

## 2021-09-28 RX ORDER — PANTOPRAZOLE SODIUM 20 MG/1
1 TABLET, DELAYED RELEASE ORAL
Qty: 15 | Refills: 0
Start: 2021-09-28

## 2021-09-28 RX ORDER — TIOTROPIUM BROMIDE 18 UG/1
1 CAPSULE ORAL; RESPIRATORY (INHALATION)
Qty: 0 | Refills: 0 | DISCHARGE
Start: 2021-09-28

## 2021-09-28 RX ADMIN — TIOTROPIUM BROMIDE 1 CAPSULE(S): 18 CAPSULE ORAL; RESPIRATORY (INHALATION) at 08:13

## 2021-09-28 RX ADMIN — Medication 1 MILLIGRAM(S): at 09:42

## 2021-09-28 RX ADMIN — CEFTRIAXONE 100 MILLIGRAM(S): 500 INJECTION, POWDER, FOR SOLUTION INTRAMUSCULAR; INTRAVENOUS at 05:31

## 2021-09-28 RX ADMIN — CHLORHEXIDINE GLUCONATE 1 APPLICATION(S): 213 SOLUTION TOPICAL at 08:47

## 2021-09-28 RX ADMIN — GABAPENTIN 600 MILLIGRAM(S): 400 CAPSULE ORAL at 05:29

## 2021-09-28 RX ADMIN — Medication 0.5 MILLIGRAM(S): at 08:12

## 2021-09-28 RX ADMIN — AZITHROMYCIN 255 MILLIGRAM(S): 500 TABLET, FILM COATED ORAL at 09:42

## 2021-09-28 RX ADMIN — OXYCODONE AND ACETAMINOPHEN 1 TABLET(S): 5; 325 TABLET ORAL at 10:12

## 2021-09-28 RX ADMIN — OXYCODONE AND ACETAMINOPHEN 1 TABLET(S): 5; 325 TABLET ORAL at 03:31

## 2021-09-28 RX ADMIN — OXYCODONE AND ACETAMINOPHEN 1 TABLET(S): 5; 325 TABLET ORAL at 09:47

## 2021-09-28 RX ADMIN — Medication 40 MILLIGRAM(S): at 05:29

## 2021-09-28 RX ADMIN — PANTOPRAZOLE SODIUM 40 MILLIGRAM(S): 20 TABLET, DELAYED RELEASE ORAL at 09:43

## 2021-09-28 NOTE — DISCHARGE NOTE PROVIDER - NSDCMRMEDTOKEN_GEN_ALL_CORE_FT
albuterol 90 mcg/inh inhalation aerosol: 2 puff(s) inhaled every 4 hours, As needed, Shortness of Breath  folic acid 1 mg oral tablet: 1 tab(s) orally once a day  gabapentin 600 mg oral tablet: 1 tab(s) orally 3 times a day  ipratropium-albuterol 0.5 mg-2.5 mg/3 mLinhalation solution: 3 milliliter(s) by nebulizer 4 times a day, As Needed     ICD 10: COPD J44.9  methotrexate 2.5 mg oral tablet: 6 tab(s) orally every 7 days on Tuesday   predniSONE 10 mg oral tablet: 4 tab a day x2days then 3 tab x2day the 2 tabx2 the 1 tab x2 day  Protonix 40 mg oral delayed release tablet: 1 tab(s) orally once a day   Symbicort 160 mcg-4.5 mcg/inh inhalation aerosol: 2 puff(s) inhaled 2 times a day   tiotropium 18 mcg inhalation capsule: 1 cap(s) inhaled once a day

## 2021-09-28 NOTE — PHYSICAL THERAPY INITIAL EVALUATION ADULT - GENERAL OBSERVATIONS, REHAB EVAL
Pt received on 4TWR, pt ok for PT by CHE [place]. pt's significant other present at bedside. pt observed sitting in chair on room air, IV lock, telemonitor with , pleasant, cooperative, A&O and c/o 0/10 pain t/o eval.

## 2021-09-28 NOTE — DISCHARGE NOTE PROVIDER - NSDCCPCAREPLAN_GEN_ALL_CORE_FT
PRINCIPAL DISCHARGE DIAGNOSIS  Diagnosis: Acute respiratory failure with hypoxia and hypercapnia  Assessment and Plan of Treatment:       SECONDARY DISCHARGE DIAGNOSES  Diagnosis: COPD exacerbation  Assessment and Plan of Treatment:     Diagnosis: Seizure-like activity  Assessment and Plan of Treatment:

## 2021-09-28 NOTE — DISCHARGE NOTE NURSING/CASE MANAGEMENT/SOCIAL WORK - PATIENT PORTAL LINK FT
You can access the FollowMyHealth Patient Portal offered by [facility] by registering at the following website: [email or web address]. By joining [employer]’s FollowMyHealth portal, you will also be able to view your health information using other applications (apps) compatible with our system.

## 2021-09-28 NOTE — DISCHARGE NOTE PROVIDER - HOSPITAL COURSE
Patient is a 61 year old female with PMH COPD on 2-3L NC  O2 at home, RA on MTX, chronic back pain, smoker, neuropathy. Recent admission for COPD exacerbation, discharged 9/14, finished steroid taper a few days ago. She presented for worsening dyspnea for 1 day that progressed to her partner calling 911 and she was found to have an O2sat in the 70s, increased to high 80s on NRB. EMS reported seizure like activity in route and she was given Versed 2.5mg IVP, which resolved shortly after. She was intubated secondary to her being obtunded on arrival to [place].     Patient was admitted to icu s/p intubation and later extubated, so2 stable 2l nc. Pt was transferred to medical floor. Pt was seen by neurologist s/p Ceeg no seizure found.  Pt is on tapering dose of steroid, was on abx which is discontinue. No sign of infection. Pt is ambulating w/2l nc so2 remained 93-95%. Pt has appointment with Pulmonary 09/01  Hx Rheumatoid arthritis continue Home dose of MTX 2.5mg (6 tabs every Tuesday).      T(C): 36.6 (09-28-21 @ 10:16), Max: 36.8 (09-27-21 @ 15:18)  HR: 64 (09-28-21 @ 10:16) (56 - 64)  BP: 115/74 (09-28-21 @ 10:16) (115/74 - 134/79)  RR: 18 (09-28-21 @ 10:16) (18 - 18)  SpO2: 93% (09-28-21 @ 10:16) (91% - 97%) RA at rest, ambulation 2L NC 93%    GEN - NAD  HEENT - NCAT, EOMI, SAGE, no JVD/bruit.  RESP - CTA BL, no wheeze/rhonchi/crackles.   CARDIO - NS1S2, RRR. No murmurs/rubs/gallops.  ABD - Soft/Non tender/Non distended. Normal BS x4 quadrants.   Ext - No KEN. no signs of venous/arterial stasis ulcers  MSK - full ROM of BL upper and lower extremities without pain or restriction. BL 5/5 strength on upper and lower extremities.   Neuro - cn 2-12 grossly intact. c. no visible seizure activity appreciated. no tremor. gait :AMBULATING W/O ASSIST  Skin - clean, dry, intact.    Psych- AAOx3.  appropriate behaviour. attentive. normal affect.    TIME SPENT>35 MINS

## 2021-09-28 NOTE — PHYSICAL THERAPY INITIAL EVALUATION ADULT - PERTINENT HX OF CURRENT PROBLEM, REHAB EVAL
pt was BIBA 2/2 respiratory distress, hypoxic in the 70s, minimally responsive and intubated to protect airway. pt is now extubated. pt reports quitting smoking approx 2 months ago

## 2021-09-28 NOTE — PHYSICAL THERAPY INITIAL EVALUATION ADULT - ADDITIONAL COMMENTS
Pt lives with significant other in a private home with multiple stairs in/out and bed&bath on the 2nd floor. Pt's PLOF was independent in all ADLs/ambulation without an Assistive Device and (+) driving PTA. pt used oxygen prn. Pt has no DME at home. At this time, no additional Assistive Device/DME required/recommended upon d/c

## 2021-09-28 NOTE — PHYSICAL THERAPY INITIAL EVALUATION ADULT - DISCHARGE DISPOSITION, PT EVAL
home. pt would be a good candidate for outpatient pulmonary rehab when cleared by MD, pt very receptive to recommendation.

## 2021-09-28 NOTE — DISCHARGE NOTE PROVIDER - CARE PROVIDER_API CALL
Mario Gentile (DO)  Critical Care Medicine; Internal Medicine; Pulmonary Disease  [address] [place] [address]  Phone: [phone]  Fax: [phone]  Follow Up Time: 1-3 days

## 2021-09-29 LAB
CULTURE RESULTS: SIGNIFICANT CHANGE UP
CULTURE RESULTS: SIGNIFICANT CHANGE UP
SPECIMEN SOURCE: SIGNIFICANT CHANGE UP
SPECIMEN SOURCE: SIGNIFICANT CHANGE UP

## 2021-10-01 ENCOUNTER — APPOINTMENT (OUTPATIENT)
Dept: PULMONOLOGY | Facility: CLINIC | Age: 61
End: 2021-10-01
Payer: MEDICARE

## 2021-10-01 VITALS
BODY MASS INDEX: 21 KG/M2 | HEART RATE: 81 BPM | HEIGHT: 64 IN | WEIGHT: 123 LBS | OXYGEN SATURATION: 95 % | RESPIRATION RATE: 14 BRPM | DIASTOLIC BLOOD PRESSURE: 78 MMHG | SYSTOLIC BLOOD PRESSURE: 108 MMHG

## 2021-10-01 DIAGNOSIS — R06.02 SHORTNESS OF BREATH: ICD-10-CM

## 2021-10-01 PROCEDURE — 99215 OFFICE O/P EST HI 40 MIN: CPT

## 2021-10-01 PROCEDURE — G9005: CPT

## 2021-10-01 RX ORDER — PANTOPRAZOLE SODIUM 40 MG/1
40 TABLET, DELAYED RELEASE ORAL
Refills: 0 | Status: ACTIVE | COMMUNITY

## 2021-10-01 RX ORDER — CERTOLIZUMAB PEGOL 200 MG/ML
6 X 200 INJECTION, SOLUTION SUBCUTANEOUS
Refills: 0 | Status: ACTIVE | COMMUNITY

## 2021-10-01 RX ORDER — METHOTREXATE 2.5 MG/1
2.5 TABLET ORAL
Refills: 0 | Status: ACTIVE | COMMUNITY

## 2021-10-01 RX ORDER — METHYLPREDNISOLONE 4 MG/1
4 TABLET ORAL
Qty: 21 | Refills: 0 | Status: DISCONTINUED | COMMUNITY
Start: 2016-11-15 | End: 2021-10-01

## 2021-10-01 RX ORDER — HYDROCODONE BITARTRATE AND ACETAMINOPHEN 5; 325 MG/1; MG/1
5-325 TABLET ORAL
Refills: 0 | Status: ACTIVE | COMMUNITY

## 2021-10-01 RX ORDER — FOLIC ACID 1 MG/1
1 TABLET ORAL
Refills: 0 | Status: ACTIVE | COMMUNITY

## 2021-10-01 RX ORDER — MELOXICAM 15 MG/1
15 TABLET ORAL
Qty: 30 | Refills: 0 | Status: DISCONTINUED | COMMUNITY
Start: 2017-03-01 | End: 2021-10-01

## 2021-10-01 RX ORDER — AMOXICILLIN AND CLAVULANATE POTASSIUM 875; 125 MG/1; MG/1
875-125 TABLET, COATED ORAL
Qty: 20 | Refills: 0 | Status: DISCONTINUED | COMMUNITY
Start: 2016-11-15 | End: 2021-10-01

## 2021-10-01 NOTE — REASON FOR VISIT
Called and spoke to pt   And stated that her GC/CH came back negative [Follow-Up - From Hospitalization] : a follow-up visit after a recent hospitalization [COPD] : COPD

## 2021-10-01 NOTE — PHYSICAL EXAM
[No Acute Distress] : no acute distress [Normal Appearance] : normal appearance [Normal Rate/Rhythm] : normal rate/rhythm [Normal S1, S2] : normal s1, s2 [No Murmurs] : no murmurs [No Rubs] : no rubs [No Gallops] : no gallops [No Resp Distress] : no resp distress [Normal Rhythm and Effort] : normal rhythm and effort [No Abnormalities] : no abnormalities [Normal Gait] : normal gait [No Clubbing] : no clubbing [No Cyanosis] : no cyanosis [No Edema] : no edema [FROM] : FROM [Normal Color/ Pigmentation] : normal color/ pigmentation [No Focal Deficits] : no focal deficits [Oriented x3] : oriented x3 [Normal Affect] : normal affect [TextBox_68] : diminished air entry bilat

## 2021-10-01 NOTE — CONSULT LETTER
[Dear  ___] : Dear  [unfilled], [Consult Letter:] : I had the pleasure of evaluating your patient, [unfilled]. [Please see my note below.] : Please see my note below. [Sincerely,] : Sincerely, [FreeTextEntry3] : [facility] DO [facility]\par Pulmonary Critical Care\par Director Pulmonary Division\par Medical Director Respiratory Therapy\par [facility]\par \par

## 2021-10-01 NOTE — DISCUSSION/SUMMARY
[FreeTextEntry1] : Severe COPD/asthma, minimal emphysema on CT, airway thickening- Eos on initial CBC\par Frequent exacerbations, now smoke free, recent bout of resp failure\par Decompensates quickly by hx, baseline functional\par ? sudden asphyxic  asthma component\par Will continue low dose prednisone till follow up, action plan reviewed\par repeat spirometry\par change to Trelegy 200 and technique reviewed\par Nebulizer use daily and prn\par Asthma profile, alpha 1\par continued smoking cessation\par stress management , flu vaccine, prnumovax\par Has home 02, follows pulse oximetry\par 6 weeks or sooner if needed, eventual Pulmonary rehab, Potential transplant referral

## 2021-10-01 NOTE — HISTORY OF PRESENT ILLNESS
[TextBox_4] : Hospital Course	 \par Patient is a 61 year old female with PMH COPD on 2-3L NC  O2 at home, RA on \par MTX, chronic back pain, smoker, neuropathy. Recent admission for COPD \par exacerbation, discharged 9/14, finished steroid taper a few days ago. She \par presented for worsening dyspnea for 1 day that progressed to her partner \par calling 911 and she was found to have an O2sat in the 70s, increased to high \par 80s on NRB. EMS reported seizure like activity in route and she was given \par Versed 2.5mg IVP, which resolved shortly after. She was intubated secondary to \par her being obtunded on arrival to [place]. \par Patient was admitted to icu s/p intubation and later extubated, so2 stable 2l \par nc. Pt was transferred to medical floor. Pt was seen by neurologist s/p Ceeg no \par seizure found. \par Pt is on tapering dose of steroid, was on abx which is discontinue. No sign of \par infection. Pt is ambulating w/2l nc so2 remained 93-95%. Pt has appointment \par with Pulmonary 09/01 \par Hx Rheumatoid arthritis continue Home dose of MTX 2.5mg (6 tabs every Tuesday). \par \par \par now smoke free\par last seen 2015 office, severe COPD, has home 02\par frequent exacerbations, recently intubated [facility] for  hypercapnic resp failure\par extubated quickly sent home on prednisone taper, Symbicort/Incruse\par no CP or sig dyspnea currently, denies any sputum\par previous Cardiac work up - [facility] was negative\par had Moderna vaccine x 2

## 2021-10-01 NOTE — PROCEDURE
[FreeTextEntry1] : Hospital records, labs,notes reviewed\par CTA no PE, , mild emphysema, airway thickening

## 2021-10-02 PROBLEM — J44.9 CHRONIC OBSTRUCTIVE PULMONARY DISEASE, UNSPECIFIED: Chronic | Status: ACTIVE | Noted: 2021-09-24

## 2021-10-04 DIAGNOSIS — Z71.89 OTHER SPECIFIED COUNSELING: ICD-10-CM

## 2021-10-19 ENCOUNTER — LABORATORY RESULT (OUTPATIENT)
Age: 61
End: 2021-10-19

## 2021-11-24 DIAGNOSIS — Z01.818 ENCOUNTER FOR OTHER PREPROCEDURAL EXAMINATION: ICD-10-CM

## 2021-11-27 ENCOUNTER — APPOINTMENT (OUTPATIENT)
Dept: DISASTER EMERGENCY | Facility: CLINIC | Age: 61
End: 2021-11-27

## 2021-11-28 LAB — SARS-COV-2 N GENE NPH QL NAA+PROBE: NOT DETECTED

## 2021-12-01 ENCOUNTER — APPOINTMENT (OUTPATIENT)
Dept: PULMONOLOGY | Facility: CLINIC | Age: 61
End: 2021-12-01
Payer: MEDICARE

## 2021-12-01 VITALS
SYSTOLIC BLOOD PRESSURE: 122 MMHG | RESPIRATION RATE: 14 BRPM | DIASTOLIC BLOOD PRESSURE: 78 MMHG | OXYGEN SATURATION: 98 % | HEART RATE: 72 BPM

## 2021-12-01 VITALS — WEIGHT: 133 LBS | HEIGHT: 64 IN | BODY MASS INDEX: 22.71 KG/M2

## 2021-12-01 DIAGNOSIS — Z23 ENCOUNTER FOR IMMUNIZATION: ICD-10-CM

## 2021-12-01 DIAGNOSIS — Z87.09 PERSONAL HISTORY OF OTHER DISEASES OF THE RESPIRATORY SYSTEM: ICD-10-CM

## 2021-12-01 PROCEDURE — G0009: CPT

## 2021-12-01 PROCEDURE — 94010 BREATHING CAPACITY TEST: CPT

## 2021-12-01 PROCEDURE — 90670 PCV13 VACCINE IM: CPT

## 2021-12-01 PROCEDURE — 99215 OFFICE O/P EST HI 40 MIN: CPT | Mod: 25

## 2021-12-01 RX ORDER — UMECLIDINIUM 62.5 UG/1
62.5 AEROSOL, POWDER ORAL
Refills: 0 | Status: DISCONTINUED | COMMUNITY
End: 2021-12-01

## 2021-12-01 NOTE — HISTORY OF PRESENT ILLNESS
[TextBox_4] : now smoke free\par has home 02 , not using\par using Treelgy 200 daily\par nebulizer Am before Trelegy\par prednisone 10 mg\par no CP , denies any sputum\par Dyspnea much improved\par previous Cardiac work up - [place] [facility] was negative\par had Moderna vaccine x 2, booster planned\par had flu vaccine

## 2021-12-01 NOTE — DISCUSSION/SUMMARY
[FreeTextEntry1] : Severe COPD/asthma, minimal emphysema on CT, airway thickening- Eos on initial CBC, small nodule RUL\par Much Improved on Trelegy, prednisone\par spirometry improved FVC and FEV1, now moderate obstruction from very severe\par Lung exam without bronchospasm, normal sp02\par Continue  Trelegy 200 \par Nebulizer use daily and prn\par wean prednisone to off over next few weeks\par Asthma profile, alpha 1 are normal, mild IgE, discussed role of biologics if frequent prednisone use ( eos on CBC in hospital)\par continued smoking cessation\par  had flu vaccine, covid x 2, booster planned, given Prevnar 13\par Has home 02, follows pulse oximetry\par Repeat CT scan ordered\par 8 weeks or sooner if needed,

## 2021-12-01 NOTE — CONSULT LETTER
[Dear  ___] : Dear  [unfilled], [Consult Letter:] : I had the pleasure of evaluating your patient, [unfilled]. [Please see my note below.] : Please see my note below. [Sincerely,] : Sincerely, [FreeTextEntry3] : Mario Gentile DO [facility]\par Pulmonary Critical Care\par Director Pulmonary Division\par Medical Director Respiratory Therapy\par [facility]\par \par

## 2022-01-01 NOTE — H&P ADULT - CARDIOVASCULAR DETAILS
positive S1/positive S2
DISPLAY PLAN FREE TEXT

## 2022-01-25 ENCOUNTER — OUTPATIENT (OUTPATIENT)
Dept: OUTPATIENT SERVICES | Facility: HOSPITAL | Age: 62
LOS: 1 days | End: 2022-01-25
Payer: MEDICARE

## 2022-01-25 ENCOUNTER — APPOINTMENT (OUTPATIENT)
Dept: CT IMAGING | Facility: CLINIC | Age: 62
End: 2022-01-25
Payer: MEDICARE

## 2022-01-25 DIAGNOSIS — R91.1 SOLITARY PULMONARY NODULE: ICD-10-CM

## 2022-01-25 DIAGNOSIS — Q76.1 KLIPPEL-FEIL SYNDROME: Chronic | ICD-10-CM

## 2022-01-25 DIAGNOSIS — Z90.89 ACQUIRED ABSENCE OF OTHER ORGANS: Chronic | ICD-10-CM

## 2022-01-25 DIAGNOSIS — Z98.89 OTHER SPECIFIED POSTPROCEDURAL STATES: Chronic | ICD-10-CM

## 2022-01-25 PROCEDURE — G1004: CPT

## 2022-01-25 PROCEDURE — 71250 CT THORAX DX C-: CPT | Mod: 26,ME

## 2022-01-25 PROCEDURE — 71250 CT THORAX DX C-: CPT | Mod: ME

## 2022-01-28 ENCOUNTER — NON-APPOINTMENT (OUTPATIENT)
Age: 62
End: 2022-01-28

## 2022-02-01 ENCOUNTER — APPOINTMENT (OUTPATIENT)
Dept: PULMONOLOGY | Facility: CLINIC | Age: 62
End: 2022-02-01
Payer: MEDICARE

## 2022-02-01 VITALS
BODY MASS INDEX: 22.66 KG/M2 | OXYGEN SATURATION: 96 % | HEART RATE: 73 BPM | WEIGHT: 132 LBS | SYSTOLIC BLOOD PRESSURE: 120 MMHG | DIASTOLIC BLOOD PRESSURE: 70 MMHG

## 2022-02-01 DIAGNOSIS — J44.9 CHRONIC OBSTRUCTIVE PULMONARY DISEASE, UNSPECIFIED: ICD-10-CM

## 2022-02-01 DIAGNOSIS — U07.1 COVID-19: ICD-10-CM

## 2022-02-01 PROCEDURE — 99215 OFFICE O/P EST HI 40 MIN: CPT | Mod: CS

## 2022-02-01 RX ORDER — PREDNISONE 10 MG/1
10 TABLET ORAL DAILY
Qty: 90 | Refills: 1 | Status: DISCONTINUED | COMMUNITY
Start: 2021-10-01 | End: 2022-02-01

## 2022-02-01 NOTE — HISTORY OF PRESENT ILLNESS
[TextBox_4] : now smoke free\par has home 02 , not using\par using Treelgy 200 daily\par nebulizer Am before Trelegy\par off prednisone\par no CP , denies any sputum\par Dyspnea much improved\par previous Cardiac work up - [place] [facility] was negative\par had Moderna vaccine x 3\par Had Covid 4 weeks ago

## 2022-02-01 NOTE — DISCUSSION/SUMMARY
[FreeTextEntry1] : COPD/asthma, Gold class II, minimal emphysema on CT, 4mm nodule RLL 1/22\par Much Improved on Trelegy 200 , now off prednisone\par spirometry improved FVC and FEV1, now moderate obstruction from very severe\par Lung exam without bronchospasm, normal sp02\par Nebulizer use daily and prn\par Asthma profile, alpha 1 are normal, mild IgE, discussed role of biologics if frequent prednisone use ( eos on CBC in hospital)\par continued smoking cessation, using nicotine lozenge\par normoxic, no desaturation with exercise\par  had flu vaccine, covid x 3, had Covid 1 month ago, now baseline\par RA om Cimzia and MTX, discussed Evusheld if new wave\par 3 months  or sooner if needed,

## 2022-02-01 NOTE — PROCEDURE
[FreeTextEntry1] : Hospital records, labs,notes reviewed\par CTA no PE, , mild emphysema, airway thickening \par \par CT 1/22 ; new 4mm nodule\par \par no desat with exercise

## 2022-02-01 NOTE — CONSULT LETTER
[Dear  ___] : Dear  [unfilled], [Consult Letter:] : I had the pleasure of evaluating your patient, [unfilled]. [Please see my note below.] : Please see my note below. [Sincerely,] : Sincerely, [FreeTextEntry3] : Mario Gentile DO [facility]\par Pulmonary Critical Care\par Director Pulmonary Division\par Medical Director Respiratory Therapy\par [facility]\par \par  [DrElina  ___] : Dr. FITZGERALD

## 2022-04-04 NOTE — ED ADULT NURSE NOTE - NS ED NURSE TRANSPORT WITH
[Time Spent: ___ minutes] : I have spent [unfilled] minutes of time on the encounter. Cardiac Monitor/Defib/ACLS/Rescue Kit/O2/BVM/oxygen/IV pump/pulse ox/ventilator

## 2022-05-05 ENCOUNTER — RX RENEWAL (OUTPATIENT)
Age: 62
End: 2022-05-05

## 2022-10-04 NOTE — ED PROVIDER NOTE - IV ALTEPLASE INCLUSION HIDDEN
Speech Therapy      Visit Type: Re-evaluation and Treatment  -  Swallow and communication/cognition  Reason for consult:     79 YOF admitted to [facility] 9/28/22 w/confusion, R sided weakness, R facial droop, & AMS. Unclear last known well, and the decision was made that she was not a candidate for IV tPA. CTA COW/Carotid showed an occlusive thrombus at the origin of the L MCA proximal L M1 segment. CTP showed a large perfusion mismatch in the L cerebral hemisphere in the L MCA territory. S/p successful mechanical thrombectomy, TICI 3.        Precautions     - Medical precautions:  fall risk and swallowing precautions; standard precautions.      - Oxygen: room air.      - Basic: IV and NG    - Lines in chart and on patient reviewed; cautions maintained through out session    - Safety measures: restraints, bed alarm and bed rails    - Cognition:         • Expression is verbal.          • Following commands impaired.      - Affect/Behavior: sleepy, alert, appropriate and calm    Current Status:     - Diet: NG tube, thin liquids and puree/dysphagia 1      - Dentition: full dentures, upper dentures, lower dentures, with patient.      - Feeding: needs assist for feeding      -       SUBJECTIVE  Okay to see pt per RN.  Three daughters at bedside.  Pts daughter requested to act as  as pt has not been responding to virtual .  Pt was received up in a chair.   Patient agreed to participate in therapy this date.   Patient/family goals: unable to state   Pain at onset of session:     -  5/10     - Location: throat pain    OBJECTIVE      Swallowing   Patient was not intubated on this admission   Patient positioning: upright in chair  Pretrial vocal quality: weak  Dentures placed; SLP asked RN to snip ng bridle lines as they were long and going in pt's mouth. Patient with +facial grimace with all swallows and pointing to ng tube as the cause. Nursing and family reporting good po intake. NG tube fdgs currently  turned off.   Thin liquid trials via straw, puree and 1 soft solid trial completed this day.     Oral phase: Significantly increased mastication time with soft solid. Mild-moderately increased bolus formation/propulsion and oral transit with soft solids. Suspect reduced bolus control with thin liquids.     Pharyngeal phase: Swallow response was generally timely; Hyolaryngeal was weak. No overt signs of aspiration were identified.       Communication/Cognition  Orientation Level:    - Person: oriented    - Place: oriented    - Year: oriented  Observation: Orientation assessed via yes/no questions  Expression-Verbal:     - Patient unable to state her name or her daughter's names unless she saw the written word.   Naming:      - Naming-60% utilizing objects in room.   Auditory Comprehension:      - Following 1-2 step commands with increased time at 80% accuracy.          ASSESSMENT  Impairments: swallowing, verbal expression, auditory comprehension and motor speech/speech intelligibility  Functional Limitations: eating/drinking, communication/cognition, expression of ideas/needs and comprehension  Pt was seen bedside for ongoing speech/swallow intervention s/p diagnosed left MCA CVA, s/p thrombectomy.   Swallowing; Pt presents with symptoms of a mild-moderate oral and suspect pharyngeal dysphagia. Pt is presenting with significant discomfort from ng tube and po intake has been fair-good. Per discussion with M.D., ng tube to be discontinued at this time. Would recommend cautious continuation of a dysphagia level 1 puree and thin liquids with 1:1 assistance, slow rate, small single bites/sips.  Pt remains at high risk for aspiration given acute neurological event.  Pt also continues to present with symptoms of a receptive/expressive aphasia and suspect motor speech impairment.   Ongoing skilled ST warranted as pt demonstrating acute decline from baseline.            • Interferring components: attention, cognitive status,  language skills and dysarthria       • Rehab Potential: fair     • Potential barriers to progress:  Current medical conditions, cognitive status, severity of deficits and reduced insight into deficits     • Skilled therapy is required to address these limitations in attempt to maximize the patient's independence. and is required to establish safe means of nutrition, hydration and medication administration    Education Provided:   Learning Assessment:  - Primary learner: patient and patient's family  - Are they ready to learn: yes  - Preferred learning style: verbal  - Barriers to learning: language and cognitive  Education provided during session:  - Receiving Education: patient and patient's family  - aphasia, dysphagia, dysarthria, aspiration precautions, oral care, role of SLP, cognition and communication  - Results of above outlined education: Verbalizes understanding    Patient at end of session:    - location: in bed    - safety measures: alarm system in place/re-engaged, bed rails x4, equipment intact, lines intact and restraints in place    - hand off to: nurse    PLAN  Speech/swallow intervention  Interventions:  Communication/cognition therapy, patient/family education, assess tolerance of least restrictive oral diet and diet advancement trials    Plan/Goal Agreement:  Family/significant other/caregiver agrees and patient agrees with goals and individualized plan of care    RECOMMENDATIONS     -Diet:          *puree/dysphagia 1 and thin liquids    -Medication Administration:         *crushed, with puree and via feeding tube    -Feeding Guidelines:          *eat slowly only when alert, sit fully upright for all po intake, stop feeding if coughing observed, small bites/sips, feed patient/total feeding assistance, allow extra time to swallow and alternate solids/liquids    -Additional Swallow Recommendations:         *frequent oral care, re-evaluate swallow and continue alternative means of nutrition     -Speech Reviewed Swallow:         *with patient/family and with clinical caregivers (MAYNOR Watts)    -Communication Cognition:          *Patient continues to demonstrate acute communication and cognition deficits, will continue to follow.  Patient will require 24/7 supervision at discharge.  Patient would benefit from intensive rehab program.      GOALS    Long Term Goals:     Pt will follow 1 step motoric directions w/50% given mod-max cues to facilitate improved auditory comprehension to participate in skilled tx - goal met    Pt will complete automatic speech tasks w/60% acc given max cues to facilitate improved verbal expression to communicate wants/needs - goal met    1. Pt to tolerate least restrictive PO diet without overt s/s aspiration to facilitate safety with PO intake  2. Pt will use SI strategies at the word level given mod cues with 80% accuracy to increase ability to express wants and needs          Documented in the chart in the following areas: Assessment.      Therapy procedure time and total treatment time can be found documented on the Time Entry flowsheet   show

## 2022-10-27 ENCOUNTER — APPOINTMENT (OUTPATIENT)
Dept: PULMONOLOGY | Facility: CLINIC | Age: 62
End: 2022-10-27
Payer: MEDICARE

## 2022-10-27 DIAGNOSIS — J44.9 CHRONIC OBSTRUCTIVE PULMONARY DISEASE, UNSPECIFIED: ICD-10-CM

## 2022-10-27 PROCEDURE — 99442: CPT | Mod: 95

## 2023-01-13 ENCOUNTER — RX RENEWAL (OUTPATIENT)
Age: 63
End: 2023-01-13

## 2023-01-24 ENCOUNTER — APPOINTMENT (OUTPATIENT)
Dept: PULMONOLOGY | Facility: CLINIC | Age: 63
End: 2023-01-24
Payer: MEDICARE

## 2023-01-24 VITALS
DIASTOLIC BLOOD PRESSURE: 64 MMHG | OXYGEN SATURATION: 97 % | HEART RATE: 67 BPM | RESPIRATION RATE: 14 BRPM | BODY MASS INDEX: 21.63 KG/M2 | WEIGHT: 126 LBS | SYSTOLIC BLOOD PRESSURE: 102 MMHG

## 2023-01-24 PROCEDURE — 99214 OFFICE O/P EST MOD 30 MIN: CPT

## 2023-01-24 RX ORDER — METHYLPREDNISOLONE 4 MG/1
4 TABLET ORAL
Qty: 1 | Refills: 1 | Status: DISCONTINUED | COMMUNITY
Start: 2022-10-27 | End: 2023-01-24

## 2023-01-24 RX ORDER — FLUTICASONE FUROATE, UMECLIDINIUM BROMIDE AND VILANTEROL TRIFENATATE 200; 62.5; 25 UG/1; UG/1; UG/1
200-62.5-25 POWDER RESPIRATORY (INHALATION) DAILY
Qty: 60 | Refills: 3 | Status: DISCONTINUED | COMMUNITY
Start: 2021-12-01 | End: 2023-01-24
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
## 2023-01-24 NOTE — PROCEDURE
[FreeTextEntry1] : Hospital records, labs,notes reviewed\par CTA no PE, , mild emphysema, airway thickening \par \par CT 1/22 ; new 4mm nodule\par

## 2023-01-24 NOTE — DISCUSSION/SUMMARY
[FreeTextEntry1] : COPD/asthma, Gold class II, minimal emphysema on CT, 4mm nodule RLL 1/22\par  remains smoke free,  on Trelegy 200 , \par Last spirometry improved FVC and FEV1, now moderate obstruction from very severe\par Lung exam with rare exp rhonchi, normal sp02\par Will treat as exacerbation, prednisone taper and abx, prn rescue and neb use stressed\par Asthma profile, alpha 1 are normal, mild IgE, discussed role of biologics if frequent prednisone use ( eos on CBC in hospital)\par continued smoking cessation,needs updated spirometry\par Vaccinations reviewed, needs updated spirometry\par Needs follow up CT scan, importance stressed\par RA om Cimzia and MTX\par 3 months  or sooner if needed, will call this month with CT scan

## 2023-01-24 NOTE — CONSULT LETTER
[Dear  ___] : Dear  [unfilled], [Consult Letter:] : I had the pleasure of evaluating your patient, [unfilled]. [Please see my note below.] : Please see my note below. [Sincerely,] : Sincerely, [DrElina  ___] : Dr. FITZGERALD [FreeTextEntry3] : [facility] Gent[facility] DO [facility]\par Pulmonary Critical Care\par Director Pulmonary Division\par Medical Director Respiratory Therapy\par [facility]\par \par

## 2023-01-25 ENCOUNTER — RX CHANGE (OUTPATIENT)
Age: 63
End: 2023-01-25

## 2023-02-21 ENCOUNTER — NON-APPOINTMENT (OUTPATIENT)
Age: 63
End: 2023-02-21

## 2023-02-23 ENCOUNTER — RX RENEWAL (OUTPATIENT)
Age: 63
End: 2023-02-23

## 2023-02-24 ENCOUNTER — NON-APPOINTMENT (OUTPATIENT)
Age: 63
End: 2023-02-24

## 2023-05-04 ENCOUNTER — RX RENEWAL (OUTPATIENT)
Age: 63
End: 2023-05-04

## 2023-05-08 ENCOUNTER — RX RENEWAL (OUTPATIENT)
Age: 63
End: 2023-05-08

## 2023-06-19 ENCOUNTER — APPOINTMENT (OUTPATIENT)
Dept: PULMONOLOGY | Facility: CLINIC | Age: 63
End: 2023-06-19

## 2023-08-30 ENCOUNTER — RX RENEWAL (OUTPATIENT)
Age: 63
End: 2023-08-30

## 2023-11-14 ENCOUNTER — APPOINTMENT (OUTPATIENT)
Dept: PULMONOLOGY | Facility: CLINIC | Age: 63
End: 2023-11-14
Payer: MEDICARE

## 2023-11-14 VITALS
SYSTOLIC BLOOD PRESSURE: 100 MMHG | HEART RATE: 78 BPM | RESPIRATION RATE: 16 BRPM | DIASTOLIC BLOOD PRESSURE: 70 MMHG | OXYGEN SATURATION: 97 %

## 2023-11-14 VITALS — HEIGHT: 64 IN | BODY MASS INDEX: 20.49 KG/M2 | WEIGHT: 120 LBS

## 2023-11-14 DIAGNOSIS — J43.9 EMPHYSEMA, UNSPECIFIED: ICD-10-CM

## 2023-11-14 DIAGNOSIS — Z87.891 PERSONAL HISTORY OF NICOTINE DEPENDENCE: ICD-10-CM

## 2023-11-14 DIAGNOSIS — R91.1 SOLITARY PULMONARY NODULE: ICD-10-CM

## 2023-11-14 DIAGNOSIS — R05.9 COUGH, UNSPECIFIED: ICD-10-CM

## 2023-11-14 DIAGNOSIS — J44.1 CHRONIC OBSTRUCTIVE PULMONARY DISEASE WITH (ACUTE) EXACERBATION: ICD-10-CM

## 2023-11-14 PROCEDURE — 99215 OFFICE O/P EST HI 40 MIN: CPT

## 2023-11-14 RX ORDER — ALBUTEROL SULFATE 90 UG/1
108 (90 BASE) INHALANT RESPIRATORY (INHALATION)
Qty: 1 | Refills: 5 | Status: ACTIVE | COMMUNITY
Start: 2023-01-24 | End: 1900-01-01

## 2023-11-14 RX ORDER — FLUTICASONE FUROATE, UMECLIDINIUM BROMIDE AND VILANTEROL TRIFENATATE 100; 62.5; 25 UG/1; UG/1; UG/1
100-62.5-25 POWDER RESPIRATORY (INHALATION) DAILY
Qty: 3 | Refills: 5 | Status: ACTIVE | COMMUNITY
Start: 1900-01-01 | End: 1900-01-01

## 2023-11-14 RX ORDER — PREDNISONE 10 MG/1
10 TABLET ORAL
Qty: 30 | Refills: 4 | Status: ACTIVE | COMMUNITY
Start: 2023-11-14 | End: 1900-01-01

## 2023-11-14 RX ORDER — AZITHROMYCIN 250 MG/1
250 TABLET, FILM COATED ORAL
Qty: 6 | Refills: 4 | Status: ACTIVE | COMMUNITY
Start: 2022-02-01 | End: 1900-01-01

## 2023-11-14 RX ORDER — IPRATROPIUM BROMIDE AND ALBUTEROL SULFATE 2.5; .5 MG/3ML; MG/3ML
0.5-2.5 (3) SOLUTION RESPIRATORY (INHALATION) 4 TIMES DAILY
Qty: 1080 | Refills: 3 | Status: ACTIVE | COMMUNITY
Start: 2023-01-24 | End: 1900-01-01

## 2023-11-14 RX ORDER — ALBUTEROL SULFATE 90 UG/1
108 (90 BASE) AEROSOL, METERED RESPIRATORY (INHALATION)
Qty: 1 | Refills: 4 | Status: ACTIVE | COMMUNITY
Start: 2016-12-13

## 2024-01-03 ENCOUNTER — APPOINTMENT (OUTPATIENT)
Dept: ORTHOPEDIC SURGERY | Facility: CLINIC | Age: 64
End: 2024-01-03
Payer: MEDICARE

## 2024-01-03 DIAGNOSIS — M79.641 PAIN IN RIGHT HAND: ICD-10-CM

## 2024-01-03 DIAGNOSIS — M65.30 TRIGGER FINGER, UNSPECIFIED FINGER: ICD-10-CM

## 2024-01-03 DIAGNOSIS — G56.03 CARPAL TUNNEL SYNDROM,BILATERAL UPPER LIMBS: ICD-10-CM

## 2024-01-03 PROCEDURE — 20526 THER INJECTION CARP TUNNEL: CPT | Mod: 50,59

## 2024-01-03 PROCEDURE — 73130 X-RAY EXAM OF HAND: CPT | Mod: 50

## 2024-01-03 PROCEDURE — 99204 OFFICE O/P NEW MOD 45 MIN: CPT | Mod: 25

## 2024-01-03 PROCEDURE — 20550 NJX 1 TENDON SHEATH/LIGAMENT: CPT | Mod: RT

## 2024-01-03 NOTE — ASSESSMENT
[FreeTextEntry1] : ASSESSMENT:  The patient comes in today with chronic exacerbated right hand pain and stiffness along with contracted fingers and numbness and tingling bilaterally.  She has known rheumatoid arthritis.  Her symptoms are affecting her ADLs.  She elects to proceed with injections today for her conditions, and would also like to proceed with occupational therapy.  The patient was adequately and thoroughly informed of my assessment of their current condition(s).  - This may diminish bodily function for the extremity.  We discussed prognosis, treatment modalities including operative and nonoperative options for the above diagnostic assessment. As always, 2nd opinion is always provided as an option. For this, when accessible, I was able to review other physicians note(s) including reviewing other tests, imaging results as well as personally view these results for my own interpretation.       Injection:    The risks and benefits of a steroid injection were discussed in detail. The risks include but are not limited to: pain, infection, swelling, flare response, bleeding, subcutaneous fat atrophy, skin depigmentation and/or elevation of blood sugar. The risk of incomplete resolution of symptoms, recurrence and additional intervention was reviewed and considered by the patient.  The patient agreed to proceed and under a sterile prep, I injected 1 unit into 1 cc of a combination of Celestone and Lidocaine into the [bilateral carpal tunnel, right ring trigger]. The patient tolerated the injection well.   The patient was adequately and thoroughly informed of my assessment of their current condition(s).    DISCUSSION:  1.  Injections as above.  OT prescription provided.  Follow-up in 2 months. 2. [x]  3. [x]

## 2024-01-03 NOTE — PHYSICAL EXAM
[de-identified] : Examination of the [bilateral] wrist reveals discomfort with compression at the level of the volar carpal tunnel eliciting numbness/tingling throughout the fingertips. Examination of the hand(s) particularly at the A1 of the [right ring finger] reveals tenderness with a palpable click. Fingers of the right hand with ulnar deviation [de-identified] : [4] views of [bilateral hands and wrists] were obtained today in my office and were seen by me and discussed with the patient.  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

## 2024-01-03 NOTE — HISTORY OF PRESENT ILLNESS
[FreeTextEntry1] : Connie is a 63-year-old female who presents today with chronic greater than 1 year history of right hand pain and stiffness along with contracted fingers and numbness and tingling bilaterally.  She has known rheumatoid arthritis.  Her symptoms are affecting her ADLs.

## 2024-02-22 ENCOUNTER — APPOINTMENT (OUTPATIENT)
Dept: ULTRASOUND IMAGING | Facility: CLINIC | Age: 64
End: 2024-02-22
Payer: MEDICARE

## 2024-02-22 ENCOUNTER — OUTPATIENT (OUTPATIENT)
Dept: OUTPATIENT SERVICES | Facility: HOSPITAL | Age: 64
LOS: 1 days | End: 2024-02-22
Payer: MEDICARE

## 2024-02-22 ENCOUNTER — APPOINTMENT (OUTPATIENT)
Dept: RADIOLOGY | Facility: CLINIC | Age: 64
End: 2024-02-22
Payer: MEDICARE

## 2024-02-22 DIAGNOSIS — Z98.89 OTHER SPECIFIED POSTPROCEDURAL STATES: Chronic | ICD-10-CM

## 2024-02-22 DIAGNOSIS — Z00.8 ENCOUNTER FOR OTHER GENERAL EXAMINATION: ICD-10-CM

## 2024-02-22 DIAGNOSIS — Z90.89 ACQUIRED ABSENCE OF OTHER ORGANS: Chronic | ICD-10-CM

## 2024-02-22 DIAGNOSIS — Q76.1 KLIPPEL-FEIL SYNDROME: Chronic | ICD-10-CM

## 2024-02-22 PROCEDURE — 73564 X-RAY EXAM KNEE 4 OR MORE: CPT

## 2024-02-22 PROCEDURE — 93971 EXTREMITY STUDY: CPT | Mod: 26

## 2024-02-22 PROCEDURE — 72050 X-RAY EXAM NECK SPINE 4/5VWS: CPT

## 2024-02-22 PROCEDURE — 73590 X-RAY EXAM OF LOWER LEG: CPT | Mod: 26,LT

## 2024-02-22 PROCEDURE — 73030 X-RAY EXAM OF SHOULDER: CPT

## 2024-02-22 PROCEDURE — 73564 X-RAY EXAM KNEE 4 OR MORE: CPT | Mod: 26,LT

## 2024-02-22 PROCEDURE — 72050 X-RAY EXAM NECK SPINE 4/5VWS: CPT | Mod: 26

## 2024-02-22 PROCEDURE — 73030 X-RAY EXAM OF SHOULDER: CPT | Mod: 26,LT

## 2024-02-22 PROCEDURE — 93971 EXTREMITY STUDY: CPT

## 2024-02-22 PROCEDURE — 73590 X-RAY EXAM OF LOWER LEG: CPT

## 2024-03-09 ENCOUNTER — OUTPATIENT (OUTPATIENT)
Dept: OUTPATIENT SERVICES | Facility: HOSPITAL | Age: 64
LOS: 1 days | End: 2024-03-09
Payer: MEDICARE

## 2024-03-09 ENCOUNTER — APPOINTMENT (OUTPATIENT)
Dept: MRI IMAGING | Facility: CLINIC | Age: 64
End: 2024-03-09
Payer: MEDICARE

## 2024-03-09 DIAGNOSIS — Q76.1 KLIPPEL-FEIL SYNDROME: Chronic | ICD-10-CM

## 2024-03-09 DIAGNOSIS — Z98.89 OTHER SPECIFIED POSTPROCEDURAL STATES: Chronic | ICD-10-CM

## 2024-03-09 DIAGNOSIS — Z90.89 ACQUIRED ABSENCE OF OTHER ORGANS: Chronic | ICD-10-CM

## 2024-03-09 DIAGNOSIS — Z00.8 ENCOUNTER FOR OTHER GENERAL EXAMINATION: ICD-10-CM

## 2024-03-09 PROCEDURE — 73718 MRI LOWER EXTREMITY W/O DYE: CPT | Mod: 26,LT,MH

## 2024-03-09 PROCEDURE — 73718 MRI LOWER EXTREMITY W/O DYE: CPT

## 2024-03-09 PROCEDURE — 73721 MRI JNT OF LWR EXTRE W/O DYE: CPT | Mod: 26,LT,MH

## 2024-03-09 PROCEDURE — 73721 MRI JNT OF LWR EXTRE W/O DYE: CPT

## 2024-04-02 ENCOUNTER — APPOINTMENT (OUTPATIENT)
Dept: ORTHOPEDIC SURGERY | Facility: CLINIC | Age: 64
End: 2024-04-02

## 2024-05-23 ENCOUNTER — APPOINTMENT (OUTPATIENT)
Dept: PULMONOLOGY | Facility: CLINIC | Age: 64
End: 2024-05-23

## 2024-06-21 RX ORDER — FLUTICASONE FUROATE, UMECLIDINIUM BROMIDE AND VILANTEROL TRIFENATATE 200; 62.5; 25 UG/1; UG/1; UG/1
200-62.5-25 POWDER RESPIRATORY (INHALATION)
Qty: 60 | Refills: 5 | Status: ACTIVE | COMMUNITY
Start: 2022-10-27 | End: 1900-01-01

## 2025-01-02 ENCOUNTER — NON-APPOINTMENT (OUTPATIENT)
Age: 65
End: 2025-01-02

## 2025-01-02 ENCOUNTER — APPOINTMENT (OUTPATIENT)
Dept: NEUROSURGERY | Facility: CLINIC | Age: 65
End: 2025-01-02
Payer: MEDICARE

## 2025-01-02 VITALS
TEMPERATURE: 98.4 F | HEIGHT: 65 IN | BODY MASS INDEX: 20.49 KG/M2 | HEART RATE: 84 BPM | WEIGHT: 123 LBS | OXYGEN SATURATION: 92 % | DIASTOLIC BLOOD PRESSURE: 53 MMHG | SYSTOLIC BLOOD PRESSURE: 101 MMHG

## 2025-01-02 DIAGNOSIS — R25.1 TREMOR, UNSPECIFIED: ICD-10-CM

## 2025-01-02 DIAGNOSIS — D43.2 NEOPLASM OF UNCERTAIN BEHAVIOR OF BRAIN, UNSPECIFIED: ICD-10-CM

## 2025-01-02 DIAGNOSIS — G95.20 UNSPECIFIED CORD COMPRESSION: ICD-10-CM

## 2025-01-02 PROCEDURE — 99204 OFFICE O/P NEW MOD 45 MIN: CPT

## 2025-01-31 ENCOUNTER — APPOINTMENT (OUTPATIENT)
Dept: MRI IMAGING | Facility: CLINIC | Age: 65
End: 2025-01-31
Payer: MEDICARE

## 2025-01-31 ENCOUNTER — OUTPATIENT (OUTPATIENT)
Dept: OUTPATIENT SERVICES | Facility: HOSPITAL | Age: 65
LOS: 1 days | End: 2025-01-31
Payer: MEDICARE

## 2025-01-31 DIAGNOSIS — Z98.89 OTHER SPECIFIED POSTPROCEDURAL STATES: Chronic | ICD-10-CM

## 2025-01-31 DIAGNOSIS — R25.1 TREMOR, UNSPECIFIED: ICD-10-CM

## 2025-01-31 DIAGNOSIS — Z90.89 ACQUIRED ABSENCE OF OTHER ORGANS: Chronic | ICD-10-CM

## 2025-01-31 DIAGNOSIS — Q76.1 KLIPPEL-FEIL SYNDROME: Chronic | ICD-10-CM

## 2025-01-31 PROCEDURE — A9585: CPT

## 2025-01-31 PROCEDURE — 70553 MRI BRAIN STEM W/O & W/DYE: CPT

## 2025-01-31 PROCEDURE — 72156 MRI NECK SPINE W/O & W/DYE: CPT | Mod: 26

## 2025-01-31 PROCEDURE — 72156 MRI NECK SPINE W/O & W/DYE: CPT

## 2025-01-31 PROCEDURE — 70553 MRI BRAIN STEM W/O & W/DYE: CPT | Mod: 26

## 2025-02-02 ENCOUNTER — OUTPATIENT (OUTPATIENT)
Dept: OUTPATIENT SERVICES | Facility: HOSPITAL | Age: 65
LOS: 1 days | End: 2025-02-02
Payer: MEDICARE

## 2025-02-02 DIAGNOSIS — Z98.89 OTHER SPECIFIED POSTPROCEDURAL STATES: Chronic | ICD-10-CM

## 2025-02-02 DIAGNOSIS — R25.1 TREMOR, UNSPECIFIED: ICD-10-CM

## 2025-02-02 DIAGNOSIS — Q76.1 KLIPPEL-FEIL SYNDROME: Chronic | ICD-10-CM

## 2025-02-02 DIAGNOSIS — Z90.89 ACQUIRED ABSENCE OF OTHER ORGANS: Chronic | ICD-10-CM

## 2025-02-02 PROCEDURE — 72158 MRI LUMBAR SPINE W/O & W/DYE: CPT

## 2025-02-02 PROCEDURE — 72157 MRI CHEST SPINE W/O & W/DYE: CPT

## 2025-02-02 PROCEDURE — A9585: CPT

## 2025-02-03 ENCOUNTER — NON-APPOINTMENT (OUTPATIENT)
Age: 65
End: 2025-02-03

## 2025-02-05 ENCOUNTER — NON-APPOINTMENT (OUTPATIENT)
Age: 65
End: 2025-02-05

## 2025-02-11 ENCOUNTER — APPOINTMENT (OUTPATIENT)
Dept: NEUROSURGERY | Facility: CLINIC | Age: 65
End: 2025-02-11
Payer: MEDICARE

## 2025-02-11 VITALS
DIASTOLIC BLOOD PRESSURE: 74 MMHG | TEMPERATURE: 97.7 F | OXYGEN SATURATION: 97 % | SYSTOLIC BLOOD PRESSURE: 110 MMHG | HEART RATE: 73 BPM | WEIGHT: 121 LBS | HEIGHT: 65 IN | BODY MASS INDEX: 20.16 KG/M2

## 2025-02-11 DIAGNOSIS — G95.20 UNSPECIFIED CORD COMPRESSION: ICD-10-CM

## 2025-02-11 DIAGNOSIS — D43.2 NEOPLASM OF UNCERTAIN BEHAVIOR OF BRAIN, UNSPECIFIED: ICD-10-CM

## 2025-02-11 PROCEDURE — 99214 OFFICE O/P EST MOD 30 MIN: CPT

## 2025-03-07 ENCOUNTER — OUTPATIENT (OUTPATIENT)
Dept: OUTPATIENT SERVICES | Facility: HOSPITAL | Age: 65
LOS: 1 days | End: 2025-03-07
Payer: MEDICARE

## 2025-03-07 ENCOUNTER — APPOINTMENT (OUTPATIENT)
Dept: RADIOLOGY | Facility: CLINIC | Age: 65
End: 2025-03-07
Payer: MEDICARE

## 2025-03-07 ENCOUNTER — APPOINTMENT (OUTPATIENT)
Dept: CT IMAGING | Facility: CLINIC | Age: 65
End: 2025-03-07
Payer: MEDICARE

## 2025-03-07 DIAGNOSIS — Q76.1 KLIPPEL-FEIL SYNDROME: Chronic | ICD-10-CM

## 2025-03-07 DIAGNOSIS — Z90.89 ACQUIRED ABSENCE OF OTHER ORGANS: Chronic | ICD-10-CM

## 2025-03-07 DIAGNOSIS — D43.2 NEOPLASM OF UNCERTAIN BEHAVIOR OF BRAIN, UNSPECIFIED: ICD-10-CM

## 2025-03-07 DIAGNOSIS — Z98.89 OTHER SPECIFIED POSTPROCEDURAL STATES: Chronic | ICD-10-CM

## 2025-03-07 PROCEDURE — 72125 CT NECK SPINE W/O DYE: CPT | Mod: 26

## 2025-03-07 PROCEDURE — 77080 DXA BONE DENSITY AXIAL: CPT | Mod: 26

## 2025-03-07 PROCEDURE — 77080 DXA BONE DENSITY AXIAL: CPT

## 2025-03-07 PROCEDURE — 72125 CT NECK SPINE W/O DYE: CPT

## 2025-03-11 ENCOUNTER — NON-APPOINTMENT (OUTPATIENT)
Age: 65
End: 2025-03-11

## 2025-03-27 ENCOUNTER — APPOINTMENT (OUTPATIENT)
Dept: NEUROSURGERY | Facility: CLINIC | Age: 65
End: 2025-03-27
Payer: MEDICARE

## 2025-03-27 VITALS
SYSTOLIC BLOOD PRESSURE: 92 MMHG | DIASTOLIC BLOOD PRESSURE: 56 MMHG | HEART RATE: 92 BPM | TEMPERATURE: 97.9 F | HEIGHT: 65 IN | OXYGEN SATURATION: 95 % | BODY MASS INDEX: 20.33 KG/M2 | WEIGHT: 122 LBS

## 2025-03-27 DIAGNOSIS — D43.2 NEOPLASM OF UNCERTAIN BEHAVIOR OF BRAIN, UNSPECIFIED: ICD-10-CM

## 2025-03-27 PROCEDURE — 99214 OFFICE O/P EST MOD 30 MIN: CPT

## 2025-04-18 ENCOUNTER — RX RENEWAL (OUTPATIENT)
Age: 65
End: 2025-04-18